# Patient Record
Sex: MALE | Race: WHITE | NOT HISPANIC OR LATINO | Employment: FULL TIME | URBAN - NONMETROPOLITAN AREA
[De-identification: names, ages, dates, MRNs, and addresses within clinical notes are randomized per-mention and may not be internally consistent; named-entity substitution may affect disease eponyms.]

---

## 2021-02-17 ENCOUNTER — HOSPITAL ENCOUNTER (EMERGENCY)
Facility: HOSPITAL | Age: 56
Discharge: HOME | End: 2021-02-17
Attending: EMERGENCY MEDICINE
Payer: COMMERCIAL

## 2021-02-17 VITALS
OXYGEN SATURATION: 98 % | RESPIRATION RATE: 18 BRPM | DIASTOLIC BLOOD PRESSURE: 90 MMHG | WEIGHT: 185 LBS | HEART RATE: 58 BPM | SYSTOLIC BLOOD PRESSURE: 145 MMHG | HEIGHT: 70 IN | BODY MASS INDEX: 26.48 KG/M2 | TEMPERATURE: 97 F

## 2021-02-17 DIAGNOSIS — M54.50 LOWER BACK PAIN: Primary | ICD-10-CM

## 2021-02-17 PROCEDURE — 99284 EMERGENCY DEPT VISIT MOD MDM: CPT | Performed by: PHYSICIAN ASSISTANT

## 2021-02-17 PROCEDURE — 99283 EMERGENCY DEPT VISIT LOW MDM: CPT

## 2021-02-17 RX ORDER — ATORVASTATIN CALCIUM 40 MG/1
TABLET, FILM COATED ORAL
COMMUNITY
Start: 2020-12-22

## 2021-02-17 RX ORDER — ASPIRIN 81 MG/1
81 TABLET ORAL DAILY
COMMUNITY

## 2021-02-17 RX ORDER — DIAZEPAM 5 MG/1
5 TABLET ORAL EVERY 12 HOURS PRN
Qty: 8 TABLET | Refills: 0 | Status: SHIPPED | OUTPATIENT
Start: 2021-02-17 | End: 2021-02-22

## 2021-02-17 RX ORDER — ACETAMINOPHEN 325 MG/1
650 TABLET ORAL ONCE
Status: COMPLETED | OUTPATIENT
Start: 2021-02-17 | End: 2021-02-17

## 2021-02-17 RX ADMIN — ACETAMINOPHEN 650 MG: 325 TABLET ORAL at 10:47

## 2021-02-17 NOTE — ED PROVIDER NOTES
History  Chief Complaint   Patient presents with    Back Pain     since last night increase of right lower back pain, pt has long history of back pain, involved with pain mgmt for same, was scheduled for injection tmw but was rescheduled due to weather, taking tylenol with no relief     54-year-old male presents emergency department for evaluation of right-sided lower back pain  Patient reports pain is chronic but was worsened last night after increased physical activity at work  Patient works in maintenance at [employer]  Reports last night he had increased physical activity and this morning had worsened back pain  Patient states he does follow with pain management for back pain  Reports he takes Tylenol at home for pain  States he gets injections in the back  Last injection was approximately 1 year ago  States he called recently for an appointment and was scheduled for tomorrow however this was rescheduled for Monday due to pending weather conditions  Patient reports he is scheduled to get 3 injections on the right side 1 injection in the left side  He denies any midline pain  He admits to some discomfort radiating down the posterior aspect of the right leg  He denies any loss of bowel or bladder control  He denies any saddle paresthesias  He denies any difficulty with bowel movements or urination  Patient denies any IV drug use  He denies any fevers or chills  Patient denies any fall or direct trauma to the back  Patient is able to ambulate  Drove himself to the emergency department        History provided by:  Patient  Back Pain  Location:  Lumbar spine  Quality:  Aching and shooting  Radiates to:  R posterior upper leg  Pain severity:  Mild  Onset quality:  Gradual  Timing:  Constant  Progression:  Worsening  Chronicity:  Recurrent  Context: lifting heavy objects and twisting    Relieved by:  Being still  Worsened by:  Palpation, ambulation, bending, movement and twisting  Associated symptoms: leg pain    Associated symptoms: no abdominal pain, no abdominal swelling, no bladder incontinence, no bowel incontinence, no chest pain, no dysuria, no fever, no headaches, no numbness, no paresthesias, no pelvic pain, no perianal numbness, no tingling, no weakness and no weight loss        Prior to Admission Medications   Prescriptions Last Dose Informant Patient Reported? Taking?   aspirin (ECOTRIN LOW STRENGTH) 81 mg EC tablet   Yes Yes   Sig: Take 81 mg by mouth daily   atorvastatin (Lipitor) 40 mg tablet   Yes Yes   Sig: Take by mouth      Facility-Administered Medications: None       Past Medical History:   Diagnosis Date    Back pain     High cholesterol     [facility]ocardial infarct [facility])        Past Surgical History:   Procedure Laterality Date    APPENDECTOMY      CORONARY ANGIOPLASTY WITH STENT PLACEMENT         History reviewed  No pertinent family history  I have reviewed and agree with the history as documented  E-Cigarette/Vaping    E-Cigarette Use Never User      E-Cigarette/Vaping Substances     Social History     Tobacco Use    Smoking status: Current Every Day Smoker     Types: Cigarettes    Smokeless tobacco: Never Used   Substance Use Topics    Alcohol use: Not Currently     Frequency: Monthly or less     Drinks per session: 1 or 2    Drug use: Never       Review of Systems   Constitutional: Negative for appetite change, chills, diaphoresis, fatigue, fever and weight loss  HENT: Negative  Eyes: Negative for visual disturbance  Respiratory: Negative for cough, choking, chest tightness, shortness of breath, wheezing and stridor  Cardiovascular: Negative  Negative for chest pain, palpitations and leg swelling  Gastrointestinal: Negative  Negative for abdominal pain and bowel incontinence  Genitourinary: Negative  Negative for bladder incontinence, dysuria and pelvic pain  Musculoskeletal: Positive for back pain   Negative for arthralgias, gait problem, joint swelling, myalgias, neck pain and neck stiffness  Skin: Negative  Negative for color change, pallor, rash and wound  Neurological: Negative  Negative for tingling, weakness, numbness, headaches and paresthesias  All other systems reviewed and are negative  Physical Exam  Physical Exam  Vitals signs and nursing note reviewed  Constitutional:       General: He is not in acute distress  Appearance: Normal appearance  He is normal weight  He is not ill-appearing, toxic-appearing or diaphoretic  HENT:      Head: Normocephalic and atraumatic  Nose: Nose normal  No congestion or rhinorrhea  Mouth/Throat:      Mouth: Mucous membranes are moist    Eyes:      Extraocular Movements: Extraocular movements intact  Conjunctiva/sclera: Conjunctivae normal       Pupils: Pupils are equal, round, and reactive to light  Neck:      Musculoskeletal: Normal range of motion and neck supple  Cardiovascular:      Rate and Rhythm: Normal rate and regular rhythm  Pulses:           Dorsalis pedis pulses are 2+ on the right side and 2+ on the left side  Posterior tibial pulses are 2+ on the right side and 2+ on the left side  Heart sounds: No murmur  Pulmonary:      Effort: Pulmonary effort is normal       Breath sounds: Normal breath sounds  No stridor  No wheezing, rhonchi or rales  Chest:      Chest wall: No tenderness  Abdominal:      General: Abdomen is flat  Bowel sounds are normal  There is no distension  Palpations: Abdomen is soft  Tenderness: There is no abdominal tenderness  There is no right CVA tenderness, left CVA tenderness or guarding  Musculoskeletal:         General: Tenderness present  No swelling or deformity  Right hip: Normal       Left hip: Normal       Right knee: Normal       Left knee: Normal       Cervical back: Normal       Thoracic back: Normal       Lumbar back: He exhibits decreased range of motion, tenderness, pain and spasm   He exhibits no bony tenderness, no swelling, no edema and no deformity  Back:       Right upper leg: Normal       Left upper leg: Normal    Skin:     General: Skin is warm and dry  Capillary Refill: Capillary refill takes less than 2 seconds  Coloration: Skin is not pale  Findings: No bruising, erythema, lesion or rash  Neurological:      General: No focal deficit present  Mental Status: He is alert and oriented to person, place, and time  Sensory: No sensory deficit  Motor: No weakness  Coordination: Coordination normal       Gait: Gait normal       Deep Tendon Reflexes: Reflexes normal    Psychiatric:         Mood and Affect: Mood normal          Behavior: Behavior normal          Thought Content: Thought content normal          Judgment: Judgment normal          Vital Signs  ED Triage Vitals [02/17/21 1019]   Temperature Pulse Respirations Blood Pressure SpO2   (!) 97 °F (36 1 °C) 61 20 (!) 175/85 100 %      Temp Source Heart Rate Source Patient Position - Orthostatic VS BP Location FiO2 (%)   Temporal Monitor Sitting Right arm --      Pain Score       5           Vitals:    02/17/21 1019 02/17/21 1030 02/17/21 1045   BP: (!) 175/85 145/80 145/90   Pulse: 61 58 58   Patient Position - Orthostatic VS: Sitting           Visual Acuity      ED Medications  Medications   acetaminophen (TYLENOL) tablet 650 mg (650 mg Oral Given 2/17/21 1047)       Diagnostic Studies  Results Reviewed     None                 No orders to display              Procedures  Procedures         ED Course  ED Course as of Feb 17 1243   Wed Feb 17, 2021   1056 I discussed physical exam findings with the patient  We discussed symptomatic treatment at home and symptoms that require prompt return to the ED  Will prescribe Valium to take at home  Patient was provided with Tylenol in the emergency department  Patient drove himself  Patient will follow-up with pain management on Monday    Patient agreed to this treatment plan he is educated on symptoms that require prompt return to the ED for further evaluation verbalized understanding  Patient was discharged home                  MDM  Number of Diagnoses or Management Options  Lower back pain: new and requires workup  Diagnosis management comments: 42-year-old male presents emergency department for evaluation of acute on chronic lower back pain  Vitals and medical record reviewed  No injury or direct trauma  Increased physical activity at work  On exam patient has tenderness over right aspect in the lumbar area  Normal deep tendon reflexes  No rashes or bruising  No midline tenderness  Patient denies any saddle paresthesias, fevers, chills  Patient has follow-up with pain management on Monday or injection  We discussed results and findings  Patient continue to use Tylenol at home  Will prescribe Valium and have patient rest  Patient was educated on symptoms that require prompt return to the emergency department for further evaluation and verbalized understanding  Patient agreed with treatment plan and was discharged home  Amount and/or Complexity of Data Reviewed  Review and summarize past medical records: yes        Disposition  Final diagnoses:   Lower back pain     Time reflects when diagnosis was documented in both MDM as applicable and the Disposition within this note     Time User Action Codes Description Comment    2/17/2021 10:41 AM Marisela Rivera Add [M54 5] Lower back pain       ED Disposition     ED Disposition Condition Date/Time Comment    Discharge Stable Wed Feb 17, 2021 10:41 AM Oswald Quevedo discharge to home/self care              Follow-up Information     Follow up With Specialties Details Why Contact Ivana Ray, DO Family Medicine In 1 week As needed, If symptoms worsen [address]  [phone]      Your pain management specialist  In 1 week For continued care           Discharge Medication List as of 2/17/2021 10:46 AM      START taking these medications    Details   diazepam (VALIUM) 5 mg tablet Take 1 tablet (5 mg total) by mouth every 12 (twelve) hours as needed for anxiety for up to 5 days, Starting Wed 2/17/2021, Until Mon 2/22/2021, Normal         CONTINUE these medications which have NOT CHANGED    Details   aspirin (ECOTRIN LOW STRENGTH) 81 mg EC tablet Take 81 mg by mouth daily, Historical Med      atorvastatin (Lipitor) 40 mg tablet Take by mouth, Starting Tue 12/22/2020, Historical Med           No discharge procedures on file      PDMP Review     None          ED Provider  Electronically Signed by           Liset Chen PA-C  02/17/21 9218

## 2021-02-17 NOTE — Clinical Note
Abdoul Barrera was seen and treated in our emergency department on 2/17/2021  Diagnosis:     Xin Boudreaux  may return to work on return date  He may return on this date: 02/19/2021         If you have any questions or concerns, please don't hesitate to call        Bj Avelar PA-C    ______________________________           _______________          _______________  Hospital Representative                              Date                                Time

## 2021-02-17 NOTE — DISCHARGE INSTRUCTIONS
If you have any new or worsening symptoms please return to the ER  Please continue to use Tylenol every 6 hours as needed for back pain  Please use Valium, muscle relaxer as prescribed  Do not operate any heavy machinery while taking this muscle relaxer as a can make you sleepy  Please follow-up with your pain management specialist   Please continue to rest avoid any heavy lifting or excessive twisting and turning

## 2021-04-26 ENCOUNTER — IMMUNIZATIONS (OUTPATIENT)
Dept: FAMILY MEDICINE CLINIC | Facility: HOSPITAL | Age: 56
End: 2021-04-26

## 2021-04-26 DIAGNOSIS — Z23 ENCOUNTER FOR IMMUNIZATION: Primary | ICD-10-CM

## 2021-04-26 PROCEDURE — 91300 SARS-COV-2 / COVID-19 MRNA VACCINE (PFIZER-BIONTECH) 30 MCG: CPT

## 2021-04-26 PROCEDURE — 0001A SARS-COV-2 / COVID-19 MRNA VACCINE (PFIZER-BIONTECH) 30 MCG: CPT

## 2021-05-20 ENCOUNTER — IMMUNIZATIONS (OUTPATIENT)
Dept: FAMILY MEDICINE CLINIC | Facility: HOSPITAL | Age: 56
End: 2021-05-20

## 2021-05-20 DIAGNOSIS — Z23 ENCOUNTER FOR IMMUNIZATION: Primary | ICD-10-CM

## 2021-05-20 PROCEDURE — 0002A SARS-COV-2 / COVID-19 MRNA VACCINE (PFIZER-BIONTECH) 30 MCG: CPT

## 2021-05-20 PROCEDURE — 91300 SARS-COV-2 / COVID-19 MRNA VACCINE (PFIZER-BIONTECH) 30 MCG: CPT

## 2023-01-23 DIAGNOSIS — N61.0 MASTITIS WITHOUT ABSCESS: ICD-10-CM

## 2023-01-23 DIAGNOSIS — N63.42 UNSPECIFIED LUMP IN LEFT BREAST, SUBAREOLAR: ICD-10-CM

## 2023-06-04 ENCOUNTER — HOSPITAL ENCOUNTER (EMERGENCY)
Facility: HOSPITAL | Age: 58
Discharge: HOME | End: 2023-06-04
Attending: EMERGENCY MEDICINE
Payer: OTHER GOVERNMENT

## 2023-06-04 ENCOUNTER — APPOINTMENT (EMERGENCY)
Dept: RADIOLOGY | Facility: HOSPITAL | Age: 58
End: 2023-06-04
Payer: OTHER GOVERNMENT

## 2023-06-04 VITALS
HEART RATE: 52 BPM | DIASTOLIC BLOOD PRESSURE: 76 MMHG | TEMPERATURE: 97.8 F | OXYGEN SATURATION: 96 % | SYSTOLIC BLOOD PRESSURE: 118 MMHG | RESPIRATION RATE: 14 BRPM

## 2023-06-04 DIAGNOSIS — R07.9 CHEST PAIN: Primary | ICD-10-CM

## 2023-06-04 LAB
2HR DELTA HS TROPONIN: 1 NG/L
ALBUMIN SERPL BCP-MCNC: 4.1 G/DL (ref 3.5–5)
ALP SERPL-CCNC: 80 U/L (ref 34–104)
ALT SERPL W P-5'-P-CCNC: 13 U/L (ref 7–52)
ANION GAP SERPL CALCULATED.3IONS-SCNC: 8 MMOL/L (ref 4–13)
APTT PPP: 30 SECONDS (ref 23–37)
AST SERPL W P-5'-P-CCNC: 18 U/L (ref 13–39)
BASOPHILS # BLD AUTO: 0.08 THOUSANDS/ÂΜL (ref 0–0.1)
BASOPHILS NFR BLD AUTO: 1 % (ref 0–1)
BILIRUB SERPL-MCNC: 0.71 MG/DL (ref 0.2–1)
BNP SERPL-MCNC: 25 PG/ML (ref 0–100)
BUN SERPL-MCNC: 16 MG/DL (ref 5–25)
CALCIUM SERPL-MCNC: 9 MG/DL (ref 8.4–10.2)
CARDIAC TROPONIN I PNL SERPL HS: 10 NG/L
CARDIAC TROPONIN I PNL SERPL HS: 11 NG/L
CHLORIDE SERPL-SCNC: 103 MMOL/L (ref 96–108)
CO2 SERPL-SCNC: 23 MMOL/L (ref 21–32)
CREAT SERPL-MCNC: 0.81 MG/DL (ref 0.6–1.3)
EOSINOPHIL # BLD AUTO: 0.23 THOUSAND/ÂΜL (ref 0–0.61)
EOSINOPHIL NFR BLD AUTO: 2 % (ref 0–6)
ERYTHROCYTE [DISTWIDTH] IN BLOOD BY AUTOMATED COUNT: 12.3 % (ref 11.6–15.1)
GFR SERPL CREATININE-BSD FRML MDRD: 98 ML/MIN/1.73SQ M
GLUCOSE SERPL-MCNC: 84 MG/DL (ref 65–140)
HCT VFR BLD AUTO: 43.1 % (ref 36.5–49.3)
HGB BLD-MCNC: 14.6 G/DL (ref 12–17)
IMM GRANULOCYTES # BLD AUTO: 0.03 THOUSAND/UL (ref 0–0.2)
IMM GRANULOCYTES NFR BLD AUTO: 0 % (ref 0–2)
INR PPP: 1.05 (ref 0.84–1.19)
LYMPHOCYTES # BLD AUTO: 3.31 THOUSANDS/ÂΜL (ref 0.6–4.47)
LYMPHOCYTES NFR BLD AUTO: 27 % (ref 14–44)
MCH RBC QN AUTO: 32.1 PG (ref 26.8–34.3)
MCHC RBC AUTO-ENTMCNC: 33.9 G/DL (ref 31.4–37.4)
MCV RBC AUTO: 95 FL (ref 82–98)
MONOCYTES # BLD AUTO: 0.84 THOUSAND/ÂΜL (ref 0.17–1.22)
MONOCYTES NFR BLD AUTO: 7 % (ref 4–12)
NEUTROPHILS # BLD AUTO: 7.8 THOUSANDS/ÂΜL (ref 1.85–7.62)
NEUTS SEG NFR BLD AUTO: 63 % (ref 43–75)
NRBC BLD AUTO-RTO: 0 /100 WBCS
PLATELET # BLD AUTO: 235 THOUSANDS/UL (ref 149–390)
PMV BLD AUTO: 10.1 FL (ref 8.9–12.7)
POTASSIUM SERPL-SCNC: 3.8 MMOL/L (ref 3.5–5.3)
PROT SERPL-MCNC: 6.9 G/DL (ref 6.4–8.4)
PROTHROMBIN TIME: 13.8 SECONDS (ref 11.6–14.5)
RBC # BLD AUTO: 4.55 MILLION/UL (ref 3.88–5.62)
SODIUM SERPL-SCNC: 134 MMOL/L (ref 135–147)
WBC # BLD AUTO: 12.29 THOUSAND/UL (ref 4.31–10.16)

## 2023-06-04 PROCEDURE — 85025 COMPLETE CBC W/AUTO DIFF WBC: CPT | Performed by: EMERGENCY MEDICINE

## 2023-06-04 PROCEDURE — 85730 THROMBOPLASTIN TIME PARTIAL: CPT | Performed by: EMERGENCY MEDICINE

## 2023-06-04 PROCEDURE — 85610 PROTHROMBIN TIME: CPT | Performed by: EMERGENCY MEDICINE

## 2023-06-04 PROCEDURE — 71045 X-RAY EXAM CHEST 1 VIEW: CPT

## 2023-06-04 PROCEDURE — 99285 EMERGENCY DEPT VISIT HI MDM: CPT

## 2023-06-04 PROCEDURE — 36415 COLL VENOUS BLD VENIPUNCTURE: CPT | Performed by: EMERGENCY MEDICINE

## 2023-06-04 PROCEDURE — 93005 ELECTROCARDIOGRAM TRACING: CPT

## 2023-06-04 PROCEDURE — 83880 ASSAY OF NATRIURETIC PEPTIDE: CPT | Performed by: EMERGENCY MEDICINE

## 2023-06-04 PROCEDURE — 84484 ASSAY OF TROPONIN QUANT: CPT | Performed by: EMERGENCY MEDICINE

## 2023-06-04 PROCEDURE — 80053 COMPREHEN METABOLIC PANEL: CPT | Performed by: EMERGENCY MEDICINE

## 2023-06-04 NOTE — Clinical Note
Richie Person was seen and treated in our emergency department on 6/4/2023  Diagnosis:     Susan Gonsalves  may return to work on return date  He may return on this date: 06/12/2023         If you have any questions or concerns, please don't hesitate to call        Vasquez Mckay DO    ______________________________           _______________          _______________  Hospital Representative                              Date                                Time

## 2023-06-04 NOTE — DISCHARGE INSTRUCTIONS
Rest, no exertional activities  Continue usual medications daily including aspirin  Call cardiologist tomorrow to arrange evaluation and further testing within next few days  Return immediately if worse or any new symptoms or reconsider hospitalization

## 2023-06-04 NOTE — ED PROVIDER NOTES
"History  Chief Complaint   Patient presents with   • Chest Pain     Pt states at 1230 started with chest pain \"like you swallowed water too hard\" and now radiating up into throat  +SOB  Denies dizziness, nausea, diaphoresis     20-year-old male presents from work describes subacute onset mild dyspnea with retrosternal chest tightness that became moderate and amenable to nitroglycerin  Not related to heavy activity using a pallet jack  This is only used nitroglycerin twice in 11 years since heart attack, 2 stents at [facility]   Has not seen cardiologist in few years  Denies viral prodrome  Smoking without difficulty  He is compliant with his hyperlipidemia medication and aspirin, usually  History provided by:  Patient  Chest Pain  Pain location:  Substernal area  Pain quality: dull and tightness    Pain radiates to:  Does not radiate  Pain radiates to the back: no    Pain severity:  Moderate  Onset quality:  Sudden  Timing:  Constant  Progression:  Improving  Chronicity:  New  Context: no trauma    Relieved by:  Nitroglycerin  Associated symptoms: shortness of breath    Associated symptoms: no abdominal pain    Risk factors: coronary artery disease and smoking        Prior to Admission Medications   Prescriptions Last Dose Informant Patient Reported? Taking?   aspirin (ECOTRIN LOW STRENGTH) 81 mg EC tablet   Yes No   Sig: Take 81 mg by mouth daily   atorvastatin (Lipitor) 40 mg tablet   Yes No   Sig: Take by mouth   diazepam (VALIUM) 5 mg tablet   No No   Sig: Take 1 tablet (5 mg total) by mouth every 12 (twelve) hours as needed for anxiety for up to 5 days      Facility-Administered Medications: None       Past Medical History:   Diagnosis Date   • Back pain    • High cholesterol    • Myocardial infarct [facility])        Past Surgical History:   Procedure Laterality Date   • APPENDECTOMY     • CORONARY ANGIOPLASTY WITH STENT PLACEMENT         History reviewed  No pertinent family history    I have " reviewed and agree with the history as documented  E-Cigarette/Vaping   • E-Cigarette Use Never User      E-Cigarette/Vaping Substances     Social History     Tobacco Use   • Smoking status: Every Day     Types: Cigarettes   • Smokeless tobacco: Never   Vaping Use   • Vaping Use: Never used   Substance Use Topics   • Alcohol use: Not Currently   • Drug use: Never       Review of Systems   Respiratory: Positive for shortness of breath  Cardiovascular: Positive for chest pain  Gastrointestinal: Negative for abdominal pain  All other systems reviewed and are negative  Physical Exam  Physical Exam  Vitals and nursing note reviewed  Constitutional:       Comments: Pleasant, comfortable-appearing   HENT:      Head: Normocephalic and atraumatic  Mouth/Throat:      Mouth: Mucous membranes are moist       Pharynx: Oropharynx is clear  Eyes:      Conjunctiva/sclera: Conjunctivae normal       Pupils: Pupils are equal, round, and reactive to light  Cardiovascular:      Rate and Rhythm: Normal rate and regular rhythm  Pulses:           Radial pulses are 2+ on the right side and 2+ on the left side  Heart sounds: Normal heart sounds  Pulmonary:      Effort: Pulmonary effort is normal       Breath sounds: Normal breath sounds  Chest:      Chest wall: No tenderness  Abdominal:      General: Bowel sounds are normal  There is no distension  Palpations: Abdomen is soft  Tenderness: There is no abdominal tenderness  Musculoskeletal:         General: No deformity  Cervical back: Neck supple  Right lower leg: No edema  Left lower leg: No edema  Skin:     General: Skin is warm and dry  Neurological:      General: No focal deficit present  Mental Status: He is alert and oriented to person, place, and time  Cranial Nerves: No cranial nerve deficit        Coordination: Coordination normal    Psychiatric:         Behavior: Behavior normal          Thought Content:  Thought content normal          Judgment: Judgment normal          Vital Signs  ED Triage Vitals [06/04/23 1350]   Temp Pulse Respirations Blood Pressure SpO2   -- 63 20 128/82 98 %      Temp src Heart Rate Source Patient Position - Orthostatic VS BP Location FiO2 (%)   -- -- -- -- --      Pain Score       --           Vitals:    06/04/23 1350 06/04/23 1400   BP: 128/82 128/82   Pulse: 63 57         Visual Acuity      ED Medications  Medications - No data to display    Diagnostic Studies  Results Reviewed     Procedure Component Value Units Date/Time    HS Troponin I 2hr [[id number]]  (Normal) Collected: 06/04/23 1603    Lab Status: Final result Specimen: Blood from Arm, Left Updated: 06/04/23 1630     hs TnI 2hr 11 ng/L      Delta 2hr hsTnI 1 ng/L     HS Troponin I 4hr [[id number]]     Lab Status: No result Specimen: Blood     Protime-INR [[id number]]  (Normal) Collected: 06/04/23 1359    Lab Status: Final result Specimen: Blood from Arm, Left Updated: 06/04/23 1458     Protime 13 8 seconds      INR 1 05    APTT [[id number]]  (Normal) Collected: 06/04/23 1359    Lab Status: Final result Specimen: Blood from Arm, Left Updated: 06/04/23 1458     PTT 30 seconds     HS Troponin 0hr (reflex protocol) [[id number]]  (Normal) Collected: 06/04/23 1359    Lab Status: Final result Specimen: Blood from Arm, Left Updated: 06/04/23 1434     hs TnI 0hr 10 ng/L     B-Type Natriuretic Peptide(BNP) [[id number]]  (Normal) Collected: 06/04/23 1359    Lab Status: Final result Specimen: Blood from Arm, Left Updated: 06/04/23 1433     BNP 25 pg/mL     Comprehensive metabolic panel [[id number]]  (Abnormal) Collected: 06/04/23 1359    Lab Status: Final result Specimen: Blood from Arm, Left Updated: 06/04/23 1431     Sodium 134 mmol/L      Potassium 3 8 mmol/L      Chloride 103 mmol/L      CO2 23 mmol/L      ANION GAP 8 mmol/L      BUN 16 mg/dL      Creatinine 0 81 mg/dL      Glucose 84 mg/dL      Calcium 9 0 mg/dL      AST 18 U/L ALT 13 U/L      Alkaline Phosphatase 80 U/L      Total Protein 6 9 g/dL      Albumin 4 1 g/dL      Total Bilirubin 0 71 mg/dL      eGFR 98 ml/min/1 73sq m     Narrative:      National Kidney Disease Foundation guidelines for Chronic Kidney Disease (CKD):   •  Stage 1 with normal or high GFR (GFR > 90 mL/min/1 73 square meters)  •  Stage 2 Mild CKD (GFR = 60-89 mL/min/1 73 square meters)  •  Stage 3A Moderate CKD (GFR = 45-59 mL/min/1 73 square meters)  •  Stage 3B Moderate CKD (GFR = 30-44 mL/min/1 73 square meters)  •  Stage 4 Severe CKD (GFR = 15-29 mL/min/1 73 square meters)  •  Stage 5 End Stage CKD (GFR <15 mL/min/1 73 square meters)  Note: GFR calculation is accurate only with a steady state creatinine    CBC and differential [[id number]]  (Abnormal) Collected: 06/04/23 1359    Lab Status: Final result Specimen: Blood from Arm, Left Updated: 06/04/23 1405     WBC 12 29 Thousand/uL      RBC 4 55 Million/uL      Hemoglobin 14 6 g/dL      Hematocrit 43 1 %      MCV 95 fL      MCH 32 1 pg      MCHC 33 9 g/dL      RDW 12 3 %      MPV 10 1 fL      Platelets 071 Thousands/uL      nRBC 0 /100 WBCs      Neutrophils Relative 63 %      Immat GRANS % 0 %      Lymphocytes Relative 27 %      Monocytes Relative 7 %      Eosinophils Relative 2 %      Basophils Relative 1 %      Neutrophils Absolute 7 80 Thousands/µL      Immature Grans Absolute 0 03 Thousand/uL      Lymphocytes Absolute 3 31 Thousands/µL      Monocytes Absolute 0 84 Thousand/µL      Eosinophils Absolute 0 23 Thousand/µL      Basophils Absolute 0 08 Thousands/µL                  XR chest portable   ED Interpretation by Berlin Lowery DO (06/04 3235)   No infiltrate or cardiomegaly or pneumothorax                 Procedures  ECG 12 Lead Documentation Only    Date/Time: 6/4/2023 1:58 PM    Performed by: Berlin Lowery DO  Authorized by: Berlin Lowery DO    Indications / Diagnosis:  Chest pain  ECG reviewed by me, the ED Provider: yes    Patient location: ED  Interpretation:     Interpretation: normal    Rate:     ECG rate:  60    ECG rate assessment: normal    Rhythm:     Rhythm: sinus rhythm    Ectopy:     Ectopy: none    QRS:     QRS axis:  Normal  Conduction:     Conduction: normal    ST segments:     ST segments:  Normal  T waves:     T waves: inverted      Inverted:  V1             ED Course  ED Course as of 06/04/23 1646   Sun Jun 04, 2023   1506 hs TnI 0hr: 10   1506 BNP: 25   1506 Sodium(!): 134   1507 WBC(!): 12 29   1601 Notes he is comfortable, no chest symptoms   1631 Delta 2hr hsTnI: 1   1631 hs TnI 2hr: 11   1641 I discussed results with patient and disposition  He prefer discharge home and outpatient follow-up  Cardiology Dr Lakshmi Jiang contacted and aware  Patient currently remains stable, pain-free  Notes his typical heart rate is lower  He chooses outpatient follow-up, is off the next few days and will contact cardiology as soon as possible to arrange  HEART Risk Score    Flowsheet Row Most Recent Value   Heart Score Risk Calculator    History 1 Filed at: 06/04/2023 1631   ECG 0 Filed at: 06/04/2023 1631   Age 1 Filed at: 06/04/2023 1631   Risk Factors 2 Filed at: 06/04/2023 1631   Troponin 0 Filed at: 06/04/2023 1631   HEART Score 4 Filed at: 06/04/2023 1631                        SBIRT 22yo+    Flowsheet Row Most Recent Value   Initial Alcohol Screen: US AUDIT-C     1  How often do you have a drink containing alcohol? 0 Filed at: 06/04/2023 1351   2  How many drinks containing alcohol do you have on a typical day you are drinking? 0 Filed at: 06/04/2023 1351   3a  Male UNDER 65: How often do you have five or more drinks on one occasion? 0 Filed at: 06/04/2023 1351   3b  FEMALE Any Age, or MALE 65+: How often do you have 4 or more drinks on one occassion? 0 Filed at: 06/04/2023 1351   Audit-C Score 0 Filed at: 06/04/2023 1351   MARYURI: How many times in the past year have you        Used an illegal drug or used a prescription medication for non-medical reasons? Never Filed at: 06/04/2023 1351                    Medical Decision Making  Chest pain: acute illness or injury  Amount and/or Complexity of Data Reviewed  External Data Reviewed: notes  Labs: ordered  Decision-making details documented in ED Course  Radiology: ordered and independent interpretation performed  Decision-making details documented in ED Course  ECG/medicine tests: ordered and independent interpretation performed  Decision-making details documented in ED Course  Disposition  Final diagnoses:   Chest pain     Time reflects when diagnosis was documented in both MDM as applicable and the Disposition within this note     Time User Action Codes Description Comment    6/4/2023  4:42 PM Martin Roy Add [R07 9] Chest pain       ED Disposition     ED Disposition   Discharge    Condition   Stable    Date/Time   Sun Jun 4, 2023  4:42 PM    Comment   Juan Alberto Velasquez [facility] discharge to home/self care  Follow-up Information     Follow up With Specialties Details Why Contact Info    Davina Huynh DO Cardiology   [address]  [phone]            Patient's Medications   Discharge Prescriptions    No medications on file       No discharge procedures on file      PDMP Review     None          ED Provider  Electronically Signed by           Jayant Heard DO  06/04/23 8073

## 2023-06-11 LAB
ATRIAL RATE: 60 BPM
P AXIS: 51 DEGREES
PR INTERVAL: 132 MS
QRS AXIS: 39 DEGREES
QRSD INTERVAL: 88 MS
QT INTERVAL: 414 MS
QTC INTERVAL: 414 MS
T WAVE AXIS: 66 DEGREES
VENTRICULAR RATE: 60 BPM

## 2023-06-11 PROCEDURE — 93010 ELECTROCARDIOGRAM REPORT: CPT | Performed by: INTERNAL MEDICINE

## 2024-02-16 ENCOUNTER — APPOINTMENT (EMERGENCY)
Dept: RADIOLOGY | Facility: HOSPITAL | Age: 59
End: 2024-02-16
Payer: OTHER GOVERNMENT

## 2024-02-16 ENCOUNTER — HOSPITAL ENCOUNTER (EMERGENCY)
Facility: HOSPITAL | Age: 59
Discharge: HOME | End: 2024-02-16
Attending: EMERGENCY MEDICINE
Payer: OTHER GOVERNMENT

## 2024-02-16 VITALS
BODY MASS INDEX: 25.41 KG/M2 | SYSTOLIC BLOOD PRESSURE: 153 MMHG | WEIGHT: 177.47 LBS | OXYGEN SATURATION: 97 % | HEART RATE: 50 BPM | TEMPERATURE: 97.2 F | HEIGHT: 70 IN | DIASTOLIC BLOOD PRESSURE: 111 MMHG | RESPIRATION RATE: 18 BRPM

## 2024-02-16 DIAGNOSIS — R07.9 CHEST PAIN: Primary | ICD-10-CM

## 2024-02-16 DIAGNOSIS — E83.42 HYPOMAGNESEMIA: ICD-10-CM

## 2024-02-16 DIAGNOSIS — R00.2 PALPITATIONS: ICD-10-CM

## 2024-02-16 LAB
2HR DELTA HS TROPONIN: -1 NG/L
ALBUMIN SERPL BCP-MCNC: 4.2 G/DL (ref 3.5–5)
ALP SERPL-CCNC: 82 U/L (ref 34–104)
ALT SERPL W P-5'-P-CCNC: 17 U/L (ref 7–52)
ANION GAP SERPL CALCULATED.3IONS-SCNC: 3 MMOL/L
AST SERPL W P-5'-P-CCNC: 17 U/L (ref 13–39)
ATRIAL RATE: 56 BPM
BASOPHILS # BLD AUTO: 0.08 THOUSANDS/ÂΜL (ref 0–0.1)
BASOPHILS NFR BLD AUTO: 1 % (ref 0–1)
BILIRUB SERPL-MCNC: 0.71 MG/DL (ref 0.2–1)
BILIRUB UR QL STRIP: NEGATIVE
BUN SERPL-MCNC: 14 MG/DL (ref 5–25)
CALCIUM SERPL-MCNC: 9.2 MG/DL (ref 8.4–10.2)
CARDIAC TROPONIN I PNL SERPL HS: 23 NG/L
CARDIAC TROPONIN I PNL SERPL HS: 24 NG/L
CHLORIDE SERPL-SCNC: 107 MMOL/L (ref 96–108)
CLARITY UR: CLEAR
CO2 SERPL-SCNC: 25 MMOL/L (ref 21–32)
COLOR UR: YELLOW
CREAT SERPL-MCNC: 0.87 MG/DL (ref 0.6–1.3)
EOSINOPHIL # BLD AUTO: 0.13 THOUSAND/ÂΜL (ref 0–0.61)
EOSINOPHIL NFR BLD AUTO: 1 % (ref 0–6)
ERYTHROCYTE [DISTWIDTH] IN BLOOD BY AUTOMATED COUNT: 12.4 % (ref 11.6–15.1)
GFR SERPL CREATININE-BSD FRML MDRD: 95 ML/MIN/1.73SQ M
GLUCOSE SERPL-MCNC: 89 MG/DL (ref 65–140)
GLUCOSE UR STRIP-MCNC: NEGATIVE MG/DL
HCT VFR BLD AUTO: 44.2 % (ref 36.5–49.3)
HGB BLD-MCNC: 15 G/DL (ref 12–17)
HGB UR QL STRIP.AUTO: NEGATIVE
IMM GRANULOCYTES # BLD AUTO: 0.03 THOUSAND/UL (ref 0–0.2)
IMM GRANULOCYTES NFR BLD AUTO: 0 % (ref 0–2)
KETONES UR STRIP-MCNC: NEGATIVE MG/DL
LACTATE SERPL-SCNC: 0.9 MMOL/L (ref 0.5–2)
LEUKOCYTE ESTERASE UR QL STRIP: NEGATIVE
LYMPHOCYTES # BLD AUTO: 2.48 THOUSANDS/ÂΜL (ref 0.6–4.47)
LYMPHOCYTES NFR BLD AUTO: 23 % (ref 14–44)
MAGNESIUM SERPL-MCNC: 1.8 MG/DL (ref 1.9–2.7)
MCH RBC QN AUTO: 31.4 PG (ref 26.8–34.3)
MCHC RBC AUTO-ENTMCNC: 33.9 G/DL (ref 31.4–37.4)
MCV RBC AUTO: 93 FL (ref 82–98)
MONOCYTES # BLD AUTO: 0.8 THOUSAND/ÂΜL (ref 0.17–1.22)
MONOCYTES NFR BLD AUTO: 7 % (ref 4–12)
NEUTROPHILS # BLD AUTO: 7.37 THOUSANDS/ÂΜL (ref 1.85–7.62)
NEUTS SEG NFR BLD AUTO: 68 % (ref 43–75)
NITRITE UR QL STRIP: NEGATIVE
NRBC BLD AUTO-RTO: 0 /100 WBCS
P AXIS: 58 DEGREES
PH UR STRIP.AUTO: 7.5 [PH]
PLATELET # BLD AUTO: 260 THOUSANDS/UL (ref 149–390)
PMV BLD AUTO: 10.3 FL (ref 8.9–12.7)
POTASSIUM SERPL-SCNC: 3.9 MMOL/L (ref 3.5–5.3)
PR INTERVAL: 130 MS
PROT SERPL-MCNC: 7 G/DL (ref 6.4–8.4)
PROT UR STRIP-MCNC: NEGATIVE MG/DL
QRS AXIS: 56 DEGREES
QRSD INTERVAL: 94 MS
QT INTERVAL: 416 MS
QTC INTERVAL: 401 MS
RBC # BLD AUTO: 4.77 MILLION/UL (ref 3.88–5.62)
SODIUM SERPL-SCNC: 135 MMOL/L (ref 135–147)
SP GR UR STRIP.AUTO: 1.01 (ref 1–1.03)
T WAVE AXIS: 62 DEGREES
TSH SERPL DL<=0.05 MIU/L-ACNC: 2.76 UIU/ML (ref 0.45–4.5)
UROBILINOGEN UR QL STRIP.AUTO: 0.2 E.U./DL
VENTRICULAR RATE: 56 BPM
WBC # BLD AUTO: 10.89 THOUSAND/UL (ref 4.31–10.16)

## 2024-02-16 PROCEDURE — 85025 COMPLETE CBC W/AUTO DIFF WBC: CPT | Performed by: PHYSICIAN ASSISTANT

## 2024-02-16 PROCEDURE — 93010 ELECTROCARDIOGRAM REPORT: CPT | Performed by: STUDENT IN AN ORGANIZED HEALTH CARE EDUCATION/TRAINING PROGRAM

## 2024-02-16 PROCEDURE — 36415 COLL VENOUS BLD VENIPUNCTURE: CPT | Performed by: PHYSICIAN ASSISTANT

## 2024-02-16 PROCEDURE — 80053 COMPREHEN METABOLIC PANEL: CPT | Performed by: PHYSICIAN ASSISTANT

## 2024-02-16 PROCEDURE — 84443 ASSAY THYROID STIM HORMONE: CPT | Performed by: PHYSICIAN ASSISTANT

## 2024-02-16 PROCEDURE — 84484 ASSAY OF TROPONIN QUANT: CPT | Performed by: PHYSICIAN ASSISTANT

## 2024-02-16 PROCEDURE — 93005 ELECTROCARDIOGRAM TRACING: CPT

## 2024-02-16 PROCEDURE — 99285 EMERGENCY DEPT VISIT HI MDM: CPT | Performed by: EMERGENCY MEDICINE

## 2024-02-16 PROCEDURE — 81003 URINALYSIS AUTO W/O SCOPE: CPT | Performed by: PHYSICIAN ASSISTANT

## 2024-02-16 PROCEDURE — 71045 X-RAY EXAM CHEST 1 VIEW: CPT

## 2024-02-16 PROCEDURE — 83605 ASSAY OF LACTIC ACID: CPT | Performed by: PHYSICIAN ASSISTANT

## 2024-02-16 PROCEDURE — 83735 ASSAY OF MAGNESIUM: CPT | Performed by: PHYSICIAN ASSISTANT

## 2024-02-16 RX ORDER — UREA 10 %
500 LOTION (ML) TOPICAL 2 TIMES DAILY
Qty: 14 TABLET | Refills: 0 | Status: SHIPPED | OUTPATIENT
Start: 2024-02-16 | End: 2024-02-23

## 2024-02-16 RX ORDER — ASPIRIN 81 MG/1
324 TABLET, CHEWABLE ORAL ONCE
Status: COMPLETED | OUTPATIENT
Start: 2024-02-16 | End: 2024-02-16

## 2024-02-16 RX ORDER — MAGNESIUM SULFATE 1 G/100ML
1 INJECTION INTRAVENOUS ONCE
Status: COMPLETED | OUTPATIENT
Start: 2024-02-16 | End: 2024-02-16

## 2024-02-16 RX ORDER — NITROGLYCERIN 0.4 MG/1
0.4 TABLET SUBLINGUAL ONCE
Status: DISCONTINUED | OUTPATIENT
Start: 2024-02-16 | End: 2024-02-16 | Stop reason: HOSPADM

## 2024-02-16 RX ADMIN — ASPIRIN 81 MG CHEWABLE TABLET 324 MG: 81 TABLET CHEWABLE at 10:52

## 2024-02-16 RX ADMIN — MAGNESIUM SULFATE HEPTAHYDRATE 1 G: 1 INJECTION, SOLUTION INTRAVENOUS at 11:53

## 2024-02-16 NOTE — Clinical Note
Zac Cha was seen and treated in our emergency department on 2/16/2024.                Diagnosis:     Zac  may return to work on return date, is off the rest of the shift today.    He may return on this date: 02/19/2024         If you have any questions or concerns, please don't hesitate to call.      Colin Barba PA-C    ______________________________           _______________          _______________  Hospital Representative                              Date                                Time

## 2024-02-16 NOTE — ED ATTENDING ATTESTATION
2/16/2024  I, Guille Booker MD, saw and evaluated the patient. I have discussed the patient with the resident/non-physician practitioner and agree with the resident's/non-physician practitioner's findings, Plan of Care, and MDM as documented in the resident's/non-physician practitioner's note, except where noted. All available labs and Radiology studies were reviewed.  I was present for key portions of any procedure(s) performed by the resident/non-physician practitioner and I was immediately available to provide assistance.       At this point I agree with the current assessment done in the Emergency Department.  I have conducted an independent evaluation of this patient a history and physical is as follows:    ED Course     History of cardiac stents.  Takes an aspirin daily.  States he complained of palpitations yesterday.  Took nitroglycerin which helped relieve that.  Has been complaining of heaviness since.  Different from past angina symptoms.  Had a nuclear stress test in June 2023 which was unremarkable.    On exam the patient is awake alert.  Nontoxic-appearing.  Lungs are clear to auscultation.  Heart is a regular rate and rhythm  Abdomen soft nontender good bowel sounds.    Critical Care Time  Procedures

## 2024-02-16 NOTE — ED PROVIDER NOTES
"History  Chief Complaint   Patient presents with    Palpitations     Patient felt like his heart was \"beating very fast and like the rhythm was off\". Symptoms began last night at 1930. Take one nitro with relief. How experiencing chest heaviness.      Patient is a 58-year-old male PMH CAD, dyslipidemia who presents to the ED with the c/o chest pressure and palpitations.  The patient states that last evening around 7 PM at rest he felt his heart was racing.  States that he took a nitro .  This episode lasted 20 minutes seem to dissipate after the nitro.  Not have pain during this episode..  The patient states that he did not have any chest pain during this episode.  Patient states that this morning when he awoke around 7 AM he was having some chest pressure.  Did not take any aspirin this morning or any nitro.  Patient tried to contact his physician to make a sick appointment and was directed to the ER.  Patient states that the chest feels \"heavy\" 1 out of 10 sensation.  Denies any shortness of breath cough, fevers chills nausea vomiting dizziness back pain abdominal pain.  States that until Monday he was a daily smoker he has now started using nicotine patches.    H/o NSTEMI with stent placement 6/25/12      Palpitations  Palpitations quality:  Fast  Onset quality:  Sudden  Duration:  20 minutes  Progression:  Resolved  Chronicity:  New  Associated symptoms: chest pain and chest pressure    Associated symptoms: no back pain, no cough, no diaphoresis, no dizziness, no leg pain, no lower extremity edema, no near-syncope, no orthopnea, no PND and no shortness of breath    Chest pain:     Quality: pressure      Severity:  Mild    Duration:  4 hours    Timing:  Constant    Progression:  Unchanged    Chronicity:  New  Risk factors: heart disease        Prior to Admission Medications   Prescriptions Last Dose Informant Patient Reported? Taking?   aspirin (ECOTRIN LOW STRENGTH) 81 mg EC tablet 2/15/2024  Yes Yes   Sig: Take " 81 mg by mouth daily   atorvastatin (Lipitor) 40 mg tablet 2/15/2024  Yes Yes   Sig: Take by mouth      Facility-Administered Medications: None       Past Medical History:   Diagnosis Date    Back pain     High cholesterol     Myocardial infarct (HCC)        Past Surgical History:   Procedure Laterality Date    APPENDECTOMY      CORONARY ANGIOPLASTY WITH STENT PLACEMENT         History reviewed. No pertinent family history.  I have reviewed and agree with the history as documented.    E-Cigarette/Vaping    E-Cigarette Use Never User      E-Cigarette/Vaping Substances     Social History     Tobacco Use    Smoking status: Every Day     Types: Cigarettes    Smokeless tobacco: Never   Vaping Use    Vaping status: Never Used   Substance Use Topics    Alcohol use: Not Currently    Drug use: Never       Review of Systems   Constitutional:  Negative for diaphoresis.   Respiratory:  Negative for cough and shortness of breath.    Cardiovascular:  Positive for chest pain. Negative for orthopnea, PND and near-syncope.   Musculoskeletal:  Negative for back pain.   Neurological:  Negative for dizziness.   All other systems reviewed and are negative.      Physical Exam  Physical Exam  Vitals and nursing note reviewed.   Constitutional:       General: He is in acute distress.      Appearance: He is well-developed.   HENT:      Head: Normocephalic and atraumatic.      Mouth/Throat:      Mouth: Mucous membranes are moist.   Eyes:      Extraocular Movements: Extraocular movements intact.      Pupils: Pupils are equal, round, and reactive to light.   Cardiovascular:      Rate and Rhythm: Normal rate and regular rhythm.      Heart sounds: No murmur heard.  Pulmonary:      Effort: Pulmonary effort is normal. No respiratory distress.      Breath sounds: Normal breath sounds.   Abdominal:      General: Bowel sounds are normal.      Palpations: Abdomen is soft.      Tenderness: There is no abdominal tenderness. There is no right CVA  tenderness or rebound.   Musculoskeletal:      Cervical back: Normal range of motion.      Right lower leg: No edema.      Left lower leg: No edema.   Skin:     General: Skin is warm and dry.      Capillary Refill: Capillary refill takes less than 2 seconds.   Neurological:      General: No focal deficit present.      Mental Status: He is alert and oriented to person, place, and time.   Psychiatric:         Behavior: Behavior normal.         Vital Signs  ED Triage Vitals [02/16/24 1040]   Temperature Pulse Respirations Blood Pressure SpO2   (!) 97.2 °F (36.2 °C) 55 16 156/73 99 %      Temp Source Heart Rate Source Patient Position - Orthostatic VS BP Location FiO2 (%)   Temporal Monitor Lying Right arm --      Pain Score       1           Vitals:    02/16/24 1230 02/16/24 1245 02/16/24 1300 02/16/24 1315   BP:  137/80 139/82 134/79   Pulse: (!) 48 (!) 49 (!) 50 (!) 49   Patient Position - Orthostatic VS:             Visual Acuity      ED Medications  Medications   nitroglycerin (NITROSTAT) SL tablet 0.4 mg (0 mg Sublingual Hold 2/16/24 1054)   aspirin chewable tablet 324 mg (324 mg Oral Given 2/16/24 1052)   magnesium sulfate IVPB (premix) SOLN 1 g (1 g Intravenous New Bag 2/16/24 1153)       Diagnostic Studies  Results Reviewed       Procedure Component Value Units Date/Time    HS Troponin I 2hr [[id number]]  (Normal) Collected: 02/16/24 1232    Lab Status: Final result Specimen: Blood from Arm, Right Updated: 02/16/24 1316     hs TnI 2hr 23 ng/L      Delta 2hr hsTnI -1 ng/L     UA w Reflex to Microscopic w Reflex to Culture [[id number]] Collected: 02/16/24 1232    Lab Status: Final result Specimen: Urine, Clean Catch Updated: 02/16/24 1247     Color, UA Yellow     Clarity, UA Clear     Specific Gravity, UA 1.015     pH, UA 7.5     Leukocytes, UA Negative     Nitrite, UA Negative     Protein, UA Negative mg/dl      Glucose, UA Negative mg/dl      Ketones, UA Negative mg/dl      Urobilinogen, UA 0.2 E.U./dl       Bilirubin, UA Negative     Occult Blood, UA Negative    TSH, 3rd generation with Free T4 reflex [[id number]]  (Normal) Collected: 02/16/24 1048    Lab Status: Final result Specimen: Blood from Arm, Left Updated: 02/16/24 1148     TSH 3RD GENERATON 2.759 uIU/mL     HS Troponin 0hr (reflex protocol) [[id number]]  (Normal) Collected: 02/16/24 1048    Lab Status: Final result Specimen: Blood from Arm, Left Updated: 02/16/24 1117     hs TnI 0hr 24 ng/L     Comprehensive metabolic panel [[id number]] Collected: 02/16/24 1048    Lab Status: Final result Specimen: Blood from Arm, Left Updated: 02/16/24 1111     Sodium 135 mmol/L      Potassium 3.9 mmol/L      Chloride 107 mmol/L      CO2 25 mmol/L      ANION GAP 3 mmol/L      BUN 14 mg/dL      Creatinine 0.87 mg/dL      Glucose 89 mg/dL      Calcium 9.2 mg/dL      AST 17 U/L      ALT 17 U/L      Alkaline Phosphatase 82 U/L      Total Protein 7.0 g/dL      Albumin 4.2 g/dL      Total Bilirubin 0.71 mg/dL      eGFR 95 ml/min/1.73sq m     Narrative:      [employer] Kid[employer] guidelines for Chronic Kidney Disease (CKD):     Stage 1 with normal or high GFR (GFR > 90 mL/min/1.73 square meters)    Stage 2 Mild CKD (GFR = 60-89 mL/min/1.73 square meters)    Stage 3A Moderate CKD (GFR = 45-59 mL/min/1.73 square meters)    Stage 3B Moderate CKD (GFR = 30-44 mL/min/1.73 square meters)    Stage 4 Severe CKD (GFR = 15-29 mL/min/1.73 square meters)    Stage 5 End Stage CKD (GFR <15 mL/min/1.73 square meters)  Note: GFR calculation is accurate only with a steady state creatinine    Magnesium [[id number]]  (Abnormal) Collected: 02/16/24 1048    Lab Status: Final result Specimen: Blood from Arm, Left Updated: 02/16/24 1111     Magnesium 1.8 mg/dL     Lactic acid, plasma (w/reflex if result > 2.0) [[id number]]  (Normal) Collected: 02/16/24 1048    Lab Status: Final result Specimen: Blood from Arm, Left Updated: 02/16/24 1110     LACTIC ACID 0.9 mmol/L     Narrative:      Result may  be elevated if tourniquet was used during collection.    CBC and differential [[id number]]  (Abnormal) Collected: 02/16/24 1048    Lab Status: Final result Specimen: Blood from Arm, Left Updated: 02/16/24 1053     WBC 10.89 Thousand/uL      RBC 4.77 Million/uL      Hemoglobin 15.0 g/dL      Hematocrit 44.2 %      MCV 93 fL      MCH 31.4 pg      MCHC 33.9 g/dL      RDW 12.4 %      MPV 10.3 fL      Platelets 260 Thousands/uL      nRBC 0 /100 WBCs      Neutrophils Relative 68 %      Immat GRANS % 0 %      Lymphocytes Relative 23 %      Monocytes Relative 7 %      Eosinophils Relative 1 %      Basophils Relative 1 %      Neutrophils Absolute 7.37 Thousands/µL      Immature Grans Absolute 0.03 Thousand/uL      Lymphocytes Absolute 2.48 Thousands/µL      Monocytes Absolute 0.80 Thousand/µL      Eosinophils Absolute 0.13 Thousand/µL      Basophils Absolute 0.08 Thousands/µL                    XR chest 1 view portable   ED Interpretation by Colin Barba PA-C (02/16 1208)   NAD                 Procedures  ECG 12 Lead Documentation Only    Date/Time: 2/16/2024 10:48 AM    Performed by: Colin Barba PA-C  Authorized by: Colin Barba PA-C    Indications / Diagnosis:  Cp  ECG reviewed by me, the ED Provider: yes    Patient location:  ED  Previous ECG:     Previous ECG:  Unavailable  Interpretation:     Interpretation: non-specific    Rate:     ECG rate:  56    ECG rate assessment: normal    Rhythm:     Rhythm: sinus bradycardia             ED Course  ED Course as of 02/16/24 1339   Fri Feb 16, 2024   1100 WBC(!): 10.89   1155 hs TnI 0hr: 24   1207 Reaching out to Nadine Zaman about patient just due to history of CAD    1332 And Nadine was contacted based on the second troponin.  At this time patient's been s asymptomatic while here and on cardiac monitoring no acute events.  At this time felt that the patient could be discharged with follow-up at home and the patient agreed with this plan would prefer to go  "home at this time.             HEART Risk Score      Flowsheet Row Most Recent Value   Heart Score Risk Calculator    History 0 Filed at: 02/16/2024 1321   ECG 0 Filed at: 02/16/2024 1321   Age 1 Filed at: 02/16/2024 1321   Risk Factors 2 Filed at: 02/16/2024 1321   Troponin 1 Filed at: 02/16/2024 1321   HEART Score 4 Filed at: 02/16/2024 1321                                        Medical Decision Making  Patient is a 58-year-old male PMH CAD, dyslipidemia who presents to the ED with the c/o chest pressure and palpitations.  The patient states that last evening around 7 PM at rest he felt his heart was racing.  States that he took a nitro .  This episode lasted 20 minutes seem to dissipate after the nitro.  Not have pain during this episode..  The patient states that he did not have any chest pain during this episode.  Patient states that this morning when he awoke around 7 AM he was having some chest pressure.  Did not take any aspirin this morning or any nitro.  Patient tried to contact his physician to make a sick appointment and was directed to the ER.  Patient states that the chest feels \"heavy\" 1 out of 10 sensation.  Denies any shortness of breath cough, fevers chills nausea vomiting dizziness back pain abdominal pain.  States that until Monday he was a daily smoker he has now started using nicotine patches.    H/o NSTEMI with stent placement 6/25/12    Patient remained stable even department on cardiac monitoring did not have any new events of palpitations or arrhythmias.  The patient had troponin x 2 performed and delta was unremarkable similar lab results x 2.  Patient's magnesium was slightly low.  Patient's heart score 4.  Did discuss with cardiology NP and who at this time felt patient could follow-up in the clinic closely with Dr. Lorenzo     Was offered observation if he felt uncomfortable going home and following up as an outpatient and he at this time would prefer discharge and felt comfortable " with outpatient follow-up.  Has seen this cardiologist before so no need for referral.    Differential Diagnosis included but was not limited to : Pneumonia, ACS, costochondritis, musculoskeletal pain, GERD, esophageal spasm, biliary colic       Amount and/or Complexity of Data Reviewed  External Data Reviewed: notes.  Labs: ordered. Decision-making details documented in ED Course.  Radiology: ordered and independent interpretation performed. Decision-making details documented in ED Course.  ECG/medicine tests: ordered and independent interpretation performed. Decision-making details documented in ED Course.  Discussion of management or test interpretation with external provider(s): Nadine Zaman NP with cardiology     Risk  OTC drugs.  Prescription drug management.             Disposition  Final diagnoses:   Chest pain   Palpitations   Hypomagnesemia     Time reflects when diagnosis was documented in both MDM as applicable and the Disposition within this note       Time User Action Codes Description Comment    2/16/2024 10:52 AM Colin Barba [R07.9] Chest pain     2/16/2024 10:52 AM Colin Barba [R00.2] Palpitations     2/16/2024  1:33 PM Colin Barba [E83.42] Hypomagnesemia           ED Disposition       ED Disposition   Discharge    Condition   Stable    Date/Time   Fri Feb 16, 2024 1333    Comment   Zac Cha discharge to home/self care.                   Follow-up Information       Follow up With Specialties Details Why Contact Info    Juni Lorenzo MD Cardiology Schedule an appointment as soon as possible for a visit   [address] [facility] [address]  [phone]              Patient's Medications   Discharge Prescriptions    MAGNESIUM GLUCONATE (MAGONATE) 500 MG TABLET    Take 1 tablet (500 mg total) by mouth 2 (two) times a day for 7 days       Start Date: 2/16/2024 End Date: 2/23/2024       Order Dose: 500 mg       Quantity: 14 tablet    Refills: 0       No discharge  procedures on file.    PDMP Review       None            ED Provider  Electronically Signed by             Colin Barba PA-C  02/16/24 0342

## 2024-11-01 ENCOUNTER — HOSPITAL ENCOUNTER (EMERGENCY)
Facility: HOSPITAL | Age: 59
Discharge: HOME | End: 2024-11-01
Attending: EMERGENCY MEDICINE
Payer: OTHER GOVERNMENT

## 2024-11-01 ENCOUNTER — APPOINTMENT (EMERGENCY)
Dept: RADIOLOGY | Facility: HOSPITAL | Age: 59
End: 2024-11-01
Payer: OTHER GOVERNMENT

## 2024-11-01 VITALS
OXYGEN SATURATION: 98 % | DIASTOLIC BLOOD PRESSURE: 95 MMHG | RESPIRATION RATE: 18 BRPM | HEART RATE: 59 BPM | TEMPERATURE: 97.5 F | SYSTOLIC BLOOD PRESSURE: 160 MMHG

## 2024-11-01 DIAGNOSIS — R07.9 CHEST PAIN: Primary | ICD-10-CM

## 2024-11-01 LAB
2HR DELTA HS TROPONIN: 0 NG/L
ALBUMIN SERPL BCG-MCNC: 4.2 G/DL (ref 3.5–5)
ALP SERPL-CCNC: 95 U/L (ref 34–104)
ALT SERPL W P-5'-P-CCNC: 14 U/L (ref 7–52)
ANION GAP SERPL CALCULATED.3IONS-SCNC: 5 MMOL/L (ref 4–13)
AST SERPL W P-5'-P-CCNC: 18 U/L (ref 13–39)
BASOPHILS # BLD AUTO: 0.07 THOUSANDS/ΜL (ref 0–0.1)
BASOPHILS NFR BLD AUTO: 1 % (ref 0–1)
BILIRUB SERPL-MCNC: 0.63 MG/DL (ref 0.2–1)
BNP SERPL-MCNC: 53 PG/ML (ref 0–100)
BUN SERPL-MCNC: 14 MG/DL (ref 5–25)
CALCIUM SERPL-MCNC: 9.1 MG/DL (ref 8.4–10.2)
CARDIAC TROPONIN I PNL SERPL HS: 8 NG/L
CARDIAC TROPONIN I PNL SERPL HS: 8 NG/L
CHLORIDE SERPL-SCNC: 103 MMOL/L (ref 96–108)
CO2 SERPL-SCNC: 27 MMOL/L (ref 21–32)
CREAT SERPL-MCNC: 0.94 MG/DL (ref 0.6–1.3)
D DIMER PPP FEU-MCNC: 0.35 UG/ML FEU
EOSINOPHIL # BLD AUTO: 0.22 THOUSAND/ΜL (ref 0–0.61)
EOSINOPHIL NFR BLD AUTO: 2 % (ref 0–6)
ERYTHROCYTE [DISTWIDTH] IN BLOOD BY AUTOMATED COUNT: 12.3 % (ref 11.6–15.1)
FLUAV AG UPPER RESP QL IA.RAPID: NEGATIVE
FLUBV AG UPPER RESP QL IA.RAPID: NEGATIVE
GFR SERPL CREATININE-BSD FRML MDRD: 88 ML/MIN/1.73SQ M
GLUCOSE SERPL-MCNC: 97 MG/DL (ref 65–140)
HCT VFR BLD AUTO: 41.6 % (ref 36.5–49.3)
HGB BLD-MCNC: 13.9 G/DL (ref 12–17)
IMM GRANULOCYTES # BLD AUTO: 0.04 THOUSAND/UL (ref 0–0.2)
IMM GRANULOCYTES NFR BLD AUTO: 0 % (ref 0–2)
LYMPHOCYTES # BLD AUTO: 3.36 THOUSANDS/ΜL (ref 0.6–4.47)
LYMPHOCYTES NFR BLD AUTO: 29 % (ref 14–44)
MCH RBC QN AUTO: 31.7 PG (ref 26.8–34.3)
MCHC RBC AUTO-ENTMCNC: 33.4 G/DL (ref 31.4–37.4)
MCV RBC AUTO: 95 FL (ref 82–98)
MONOCYTES # BLD AUTO: 0.79 THOUSAND/ΜL (ref 0.17–1.22)
MONOCYTES NFR BLD AUTO: 7 % (ref 4–12)
NEUTROPHILS # BLD AUTO: 7.24 THOUSANDS/ΜL (ref 1.85–7.62)
NEUTS SEG NFR BLD AUTO: 61 % (ref 43–75)
NRBC BLD AUTO-RTO: 0 /100 WBCS
PLATELET # BLD AUTO: 220 THOUSANDS/UL (ref 149–390)
PMV BLD AUTO: 10.3 FL (ref 8.9–12.7)
POTASSIUM SERPL-SCNC: 3.9 MMOL/L (ref 3.5–5.3)
PROT SERPL-MCNC: 6.8 G/DL (ref 6.4–8.4)
RBC # BLD AUTO: 4.38 MILLION/UL (ref 3.88–5.62)
SARS-COV+SARS-COV-2 AG RESP QL IA.RAPID: NEGATIVE
SODIUM SERPL-SCNC: 135 MMOL/L (ref 135–147)
WBC # BLD AUTO: 11.72 THOUSAND/UL (ref 4.31–10.16)

## 2024-11-01 PROCEDURE — 80053 COMPREHEN METABOLIC PANEL: CPT | Performed by: EMERGENCY MEDICINE

## 2024-11-01 PROCEDURE — 85379 FIBRIN DEGRADATION QUANT: CPT | Performed by: EMERGENCY MEDICINE

## 2024-11-01 PROCEDURE — 71046 X-RAY EXAM CHEST 2 VIEWS: CPT

## 2024-11-01 PROCEDURE — 99285 EMERGENCY DEPT VISIT HI MDM: CPT

## 2024-11-01 PROCEDURE — 84484 ASSAY OF TROPONIN QUANT: CPT | Performed by: EMERGENCY MEDICINE

## 2024-11-01 PROCEDURE — 83880 ASSAY OF NATRIURETIC PEPTIDE: CPT | Performed by: EMERGENCY MEDICINE

## 2024-11-01 PROCEDURE — 99285 EMERGENCY DEPT VISIT HI MDM: CPT | Performed by: EMERGENCY MEDICINE

## 2024-11-01 PROCEDURE — 85025 COMPLETE CBC W/AUTO DIFF WBC: CPT | Performed by: EMERGENCY MEDICINE

## 2024-11-01 PROCEDURE — 87811 SARS-COV-2 COVID19 W/OPTIC: CPT | Performed by: EMERGENCY MEDICINE

## 2024-11-01 PROCEDURE — 87804 INFLUENZA ASSAY W/OPTIC: CPT | Performed by: EMERGENCY MEDICINE

## 2024-11-01 PROCEDURE — 36415 COLL VENOUS BLD VENIPUNCTURE: CPT

## 2024-11-01 PROCEDURE — 94640 AIRWAY INHALATION TREATMENT: CPT

## 2024-11-01 PROCEDURE — 93005 ELECTROCARDIOGRAM TRACING: CPT

## 2024-11-01 RX ORDER — IPRATROPIUM BROMIDE AND ALBUTEROL SULFATE 2.5; .5 MG/3ML; MG/3ML
3 SOLUTION RESPIRATORY (INHALATION) ONCE
Status: COMPLETED | OUTPATIENT
Start: 2024-11-01 | End: 2024-11-01

## 2024-11-01 RX ADMIN — IPRATROPIUM BROMIDE AND ALBUTEROL SULFATE 3 ML: .5; 3 SOLUTION RESPIRATORY (INHALATION) at 13:31

## 2024-11-01 NOTE — ED PROVIDER NOTES
Time reflects when diagnosis was documented in both MDM as applicable and the Disposition within this note       Time User Action Codes Description Comment    11/1/2024  2:45 PM Arturo Daily Add [R07.9] Chest pain           ED Disposition       ED Disposition   Discharge    Condition   Stable    Date/Time   Fri Nov 1, 2024  2:45 PM    Comment   Zac Cha discharge to home/self care.                   Assessment & Plan       Medical Decision Making  Based on the history and medical screening exam performed the patient may be at risk for ACS, STEMI, NSTEMI, muscular chest pain, asthma, viral respiratory illness, seasonal allergies, pneumonia.    Based on the work-up performed in the emergency room which includes physical examination, and which may include laboratory studies and imaging as warranted including advanced imaging such as CT scan or ultrasound, the diagnostic considerations are narrowed to exclude limb or life-threatening process.    The patient is stable for discharge.  Workup in the emergency room is negative including EKG, serial troponins, chest x-ray.  Patient is improved after nebulization.  D-dimer also negative    Amount and/or Complexity of Data Reviewed  Labs: ordered. Decision-making details documented in ED Course.     Details: Serial troponins and D-dimer negative  Radiology: ordered and independent interpretation performed. Decision-making details documented in ED Course.     Details: Chest x-ray negative  ECG/medicine tests: ordered and independent interpretation performed. Decision-making details documented in ED Course.     Details: Sinus bradycardia rate 53    Risk  Prescription drug management.             Medications   ipratropium-albuterol (DUO-NEB) 0.5-2.5 mg/3 mL inhalation solution 3 mL (3 mL Nebulization Given 11/1/24 1331)       ED Risk Strat Scores   HEART Risk Score      Flowsheet Row Most Recent Value   Heart Score Risk Calculator    History 0 Filed at: 11/01/2024 2448    ECG 0 Filed at: 11/01/2024 1439   Age 1 Filed at: 11/01/2024 1439   Risk Factors 1 Filed at: 11/01/2024 1439   Troponin 0 Filed at: 11/01/2024 1439   HEART Score 2 Filed at: 11/01/2024 1439                               SBIRT 20yo+      Flowsheet Row Most Recent Value   Initial Alcohol Screen: US AUDIT-C     1. How often do you have a drink containing alcohol? 0 Filed at: 11/01/2024 1140   2. How many drinks containing alcohol do you have on a typical day you are drinking?  0 Filed at: 11/01/2024 1140   3a. Male UNDER 65: How often do you have five or more drinks on one occasion? 0 Filed at: 11/01/2024 1140   Audit-C Score 0 Filed at: 11/01/2024 1140   MARYURI: How many times in the past year have you...    Used an illegal drug or used a prescription medication for non-medical reasons? Never Filed at: 11/01/2024 1140                            History of Present Illness       Chief Complaint   Patient presents with    Shortness of Breath     Pt reports sudden onset of chest tightness one hour ago- hx of MI but denies chest pain        Past Medical History:   Diagnosis Date    Back pain     High cholesterol     Myocardial infarct (HCC)       Past Surgical History:   Procedure Laterality Date    APPENDECTOMY      CORONARY ANGIOPLASTY WITH STENT PLACEMENT        History reviewed. No pertinent family history.   Social History     Tobacco Use    Smoking status: Every Day     Types: Cigarettes    Smokeless tobacco: Never   Vaping Use    Vaping status: Never Used   Substance Use Topics    Alcohol use: Not Currently    Drug use: Never      E-Cigarette/Vaping    E-Cigarette Use Never User       E-Cigarette/Vaping Substances      I have reviewed and agree with the history as documented.     Patient complains of chest pressure and shortness of breath since 10 this morning.  Denies fevers or chills.  Denies nausea or vomiting.  No abdominal pain.  No other complaints.  States symptoms have improved somewhat since onset but have  not resolved.  Patient has prior history of heart attack and coronary artery disease currently only medications are aspirin and Lipitor.  Patient does smoke cigarettes.      History provided by:  Patient   used: No    Shortness of Breath  Severity:  Moderate  Onset quality:  Gradual  Duration:  3 hours  Timing:  Constant  Progression:  Unchanged  Chronicity:  New  Relieved by:  Nothing  Worsened by:  Nothing  Ineffective treatments:  None tried  Associated symptoms: no abdominal pain, no chest pain, no cough, no ear pain, no fever, no headaches, no neck pain, no rash, no sore throat, no syncope, no vomiting and no wheezing        Review of Systems   Constitutional:  Negative for chills and fever.   HENT:  Negative for ear pain, hearing loss, sore throat, trouble swallowing and voice change.    Eyes:  Negative for pain and discharge.   Respiratory:  Positive for shortness of breath. Negative for cough and wheezing.    Cardiovascular:  Negative for chest pain, palpitations and syncope.   Gastrointestinal:  Negative for abdominal pain, blood in stool, constipation, diarrhea, nausea and vomiting.   Genitourinary:  Negative for dysuria, flank pain, frequency and hematuria.   Musculoskeletal:  Negative for joint swelling, neck pain and neck stiffness.   Skin:  Negative for rash and wound.   Neurological:  Negative for dizziness, seizures, syncope, facial asymmetry and headaches.   Psychiatric/Behavioral:  Negative for hallucinations, self-injury and suicidal ideas.    All other systems reviewed and are negative.          Objective       ED Triage Vitals [11/01/24 1140]   Temperature Pulse Blood Pressure Respirations SpO2 Patient Position - Orthostatic VS   97.5 °F (36.4 °C) 59 160/95 18 98 % Sitting      Temp Source Heart Rate Source BP Location FiO2 (%) Pain Score    Temporal Monitor Left arm -- --      Vitals      Date and Time Temp Pulse SpO2 Resp BP Pain Score FACES Pain Rating User   11/01/24 1140  97.5 °F (36.4 °C) 59 98 % 18 160/95 -- -- SS            Physical Exam  Vitals and nursing note reviewed.   Constitutional:       General: He is not in acute distress.     Appearance: He is well-developed.   HENT:      Head: Normocephalic and atraumatic.      Right Ear: External ear normal.      Left Ear: External ear normal.   Eyes:      General: No scleral icterus.        Right eye: No discharge.         Left eye: No discharge.      Extraocular Movements: Extraocular movements intact.      Conjunctiva/sclera: Conjunctivae normal.   Cardiovascular:      Rate and Rhythm: Normal rate and regular rhythm.      Heart sounds: Normal heart sounds. No murmur heard.  Pulmonary:      Effort: Pulmonary effort is normal.      Breath sounds: Normal breath sounds. No decreased breath sounds, wheezing, rhonchi or rales.   Abdominal:      General: Bowel sounds are normal. There is no distension.      Palpations: Abdomen is soft.      Tenderness: There is no abdominal tenderness. There is no guarding or rebound.   Musculoskeletal:         General: No deformity. Normal range of motion.      Cervical back: Normal range of motion and neck supple.   Skin:     General: Skin is warm and dry.      Findings: No rash.   Neurological:      General: No focal deficit present.      Mental Status: He is alert and oriented to person, place, and time.      Cranial Nerves: No cranial nerve deficit.   Psychiatric:         Mood and Affect: Mood normal.         Behavior: Behavior normal.         Thought Content: Thought content normal.         Judgment: Judgment normal.         Results Reviewed       Procedure Component Value Units Date/Time    HS Troponin I 2hr [[id number]]  (Normal) Collected: 11/01/24 1343    Lab Status: Final result Specimen: Blood from Arm, Left Updated: 11/01/24 1425     hs TnI 2hr 8 ng/L      Delta 2hr hsTnI 0 ng/L     COVID-19/ Infleunza A/B Rapid Anitgen(30 min. TAT) [[id number]]  (Normal) Collected: 11/01/24 1303    Lab Status:  Final result Specimen: Nares from Nose Updated: 11/01/24 1324     SARS COV Rapid Antigen Negative     Influenza A Rapid Antigen Negative     Influenza B Rapid Antigen Negative    Narrative:      This test has been performed using the [employer] Liseth 2 FLU+SARS Antigen test under the Emergency Use Authorization (EUA). This test has been validated by the  and verified by the performing laboratory. The Liseth uses lateral flow immunofluorescent sandwich assay to detect SARS-COV, Influenza A and Influenza B Antigen.     The Quidel Liseth 2 SARS Antigen test does not differentiate between SARS-CoV and SARS-CoV-2.     Negative results are presumptive and may be confirmed with a molecular assay, if necessary, for patient management. Negative results do not rule out SARS-CoV-2 or influenza infection and should not be used as the sole basis for treatment or patient management decisions. A negative test result may occur if the level of antigen in a sample is below the limit of detection of this test.     Positive results are indicative of the presence of viral antigens, but do not rule out bacterial infection or co-infection with other viruses.     All test results should be used as an adjunct to clinical observations and other information available to the provider.    FOR PEDIATRIC PATIENTS - copy/paste COVID Guidelines URL to browser: [email or web address]    D-dimer, quantitative [[id number]]  (Normal) Collected: 11/01/24 1146    Lab Status: Final result Specimen: Blood from Arm, Right Updated: 11/01/24 1311     D-Dimer, Quant 0.35 ug/ml FEU     Narrative:      In the evaluation for possible pulmonary embolism, in the appropriate (Well's Score of 4 or less) patient, the age adjusted d-dimer cutoff for this patient can be calculated as:    Age x 0.01 (in ug/mL) for Age-adjusted D-dimer exclusion threshold for a patient over 50 years.    B-Type Natriuretic  Peptide(BNP) [[id number]]  (Normal) Collected: 11/01/24 1146    Lab Status: Final result Specimen: Blood from Arm, Right Updated: 11/01/24 1222     BNP 53 pg/mL     HS Troponin 0hr (reflex protocol) [[id number]]  (Normal) Collected: 11/01/24 1146    Lab Status: Final result Specimen: Blood from Arm, Right Updated: 11/01/24 1220     hs TnI 0hr 8 ng/L     Comprehensive metabolic panel [[id number]] Collected: 11/01/24 1146    Lab Status: Final result Specimen: Blood from Arm, Right Updated: 11/01/24 1212     Sodium 135 mmol/L      Potassium 3.9 mmol/L      Chloride 103 mmol/L      CO2 27 mmol/L      ANION GAP 5 mmol/L      BUN 14 mg/dL      Creatinine 0.94 mg/dL      Glucose 97 mg/dL      Calcium 9.1 mg/dL      AST 18 U/L      ALT 14 U/L      Alkaline Phosphatase 95 U/L      Total Protein 6.8 g/dL      Albumin 4.2 g/dL      Total Bilirubin 0.63 mg/dL      eGFR 88 ml/min/1.73sq m     Narrative:      National Kidney Disease Foundation guidelines for Chronic Kidney Disease (CKD):     Stage 1 with normal or high GFR (GFR > 90 mL/min/1.73 square meters)    Stage 2 Mild CKD (GFR = 60-89 mL/min/1.73 square meters)    Stage 3A Moderate CKD (GFR = 45-59 mL/min/1.73 square meters)    Stage 3B Moderate CKD (GFR = 30-44 mL/min/1.73 square meters)    Stage 4 Severe CKD (GFR = 15-29 mL/min/1.73 square meters)    Stage 5 End Stage CKD (GFR <15 mL/min/1.73 square meters)  Note: GFR calculation is accurate only with a steady state creatinine    CBC and differential [[id number]]  (Abnormal) Collected: 11/01/24 1146    Lab Status: Final result Specimen: Blood from Arm, Right Updated: 11/01/24 1156     WBC 11.72 Thousand/uL      RBC 4.38 Million/uL      Hemoglobin 13.9 g/dL      Hematocrit 41.6 %      MCV 95 fL      MCH 31.7 pg      MCHC 33.4 g/dL      RDW 12.3 %      MPV 10.3 fL      Platelets 220 Thousands/uL      nRBC 0 /100 WBCs      Segmented % 61 %      Immature Grans % 0 %      Lymphocytes % 29 %      Monocytes % 7 %      Eosinophils  Relative 2 %      Basophils Relative 1 %      Absolute Neutrophils 7.24 Thousands/µL      Absolute Immature Grans 0.04 Thousand/uL      Absolute Lymphocytes 3.36 Thousands/µL      Absolute Monocytes 0.79 Thousand/µL      Eosinophils Absolute 0.22 Thousand/µL      Basophils Absolute 0.07 Thousands/µL             XR chest pa and lateral   ED Interpretation by Arturo Daily MD (11/01 8238)   No acute finding          ECG 12 Lead Documentation Only    Date/Time: 11/1/2024 11:45 AM    Performed by: Arturo Daily MD  Authorized by: Arturo Daily MD    ECG reviewed by me, the ED Provider: yes    Patient location:  ED  Previous ECG:     Previous ECG:  Unavailable  Interpretation:     Interpretation: non-specific    Rate:     ECG rate:  53    ECG rate assessment: bradycardic    Rhythm:     Rhythm: sinus bradycardia    Ectopy:     Ectopy: none    QRS:     QRS axis:  Normal    QRS intervals:  Normal  Conduction:     Conduction: normal    ST segments:     ST segments:  Normal  T waves:     T waves: normal        ED Medication and Procedure Management   Prior to Admission Medications   Prescriptions Last Dose Informant Patient Reported? Taking?   aspirin (ECOTRIN LOW STRENGTH) 81 mg EC tablet   Yes No   Sig: Take 81 mg by mouth daily   atorvastatin (Lipitor) 40 mg tablet   Yes No   Sig: Take by mouth   magnesium gluconate (MAGONATE) 500 mg tablet   No No   Sig: Take 1 tablet (500 mg total) by mouth 2 (two) times a day for 7 days      Facility-Administered Medications: None     Discharge Medication List as of 11/1/2024  2:45 PM        CONTINUE these medications which have NOT CHANGED    Details   aspirin (ECOTRIN LOW STRENGTH) 81 mg EC tablet Take 81 mg by mouth daily, Historical Med      atorvastatin (Lipitor) 40 mg tablet Take by mouth, Starting Tue 12/22/2020, Historical Med      magnesium gluconate (MAGONATE) 500 mg tablet Take 1 tablet (500 mg total) by mouth 2 (two) times a day for 7 days, Starting Fri  2/16/2024, Until Fri 2/23/2024, Normal           No discharge procedures on file.  ED SEPSIS DOCUMENTATION   Time reflects when diagnosis was documented in both MDM as applicable and the Disposition within this note       Time User Action Codes Description Comment    11/1/2024  2:45 PM Arturo Daily Add [R07.9] Chest pain                  Arturo Daily MD  11/01/24 2048

## 2024-11-04 LAB
ATRIAL RATE: 53 BPM
P AXIS: 59 DEGREES
PR INTERVAL: 130 MS
QRS AXIS: 53 DEGREES
QRSD INTERVAL: 84 MS
QT INTERVAL: 426 MS
QTC INTERVAL: 399 MS
T WAVE AXIS: 61 DEGREES
VENTRICULAR RATE: 53 BPM

## 2024-11-04 PROCEDURE — 93010 ELECTROCARDIOGRAM REPORT: CPT | Performed by: INTERNAL MEDICINE

## 2024-12-13 ENCOUNTER — CONSULT (OUTPATIENT)
Dept: PAIN MEDICINE | Facility: CLINIC | Age: 59
End: 2024-12-13
Payer: OTHER GOVERNMENT

## 2024-12-13 ENCOUNTER — PREP FOR PROCEDURE (OUTPATIENT)
Dept: PAIN MEDICINE | Facility: CLINIC | Age: 59
End: 2024-12-13

## 2024-12-13 VITALS
SYSTOLIC BLOOD PRESSURE: 140 MMHG | DIASTOLIC BLOOD PRESSURE: 82 MMHG | RESPIRATION RATE: 18 BRPM | OXYGEN SATURATION: 97 % | HEIGHT: 70 IN | BODY MASS INDEX: 26.26 KG/M2 | HEART RATE: 58 BPM | WEIGHT: 183.4 LBS | TEMPERATURE: 96.9 F

## 2024-12-13 DIAGNOSIS — M54.16 LUMBAR RADICULOPATHY: Primary | ICD-10-CM

## 2024-12-13 DIAGNOSIS — M47.26 OTHER SPONDYLOSIS WITH RADICULOPATHY, LUMBAR REGION: Primary | ICD-10-CM

## 2024-12-13 PROCEDURE — 99244 OFF/OP CNSLTJ NEW/EST MOD 40: CPT | Performed by: ANESTHESIOLOGY

## 2024-12-13 RX ORDER — DIPHENOXYLATE HYDROCHLORIDE AND ATROPINE SULFATE 2.5; .025 MG/1; MG/1
1 TABLET ORAL DAILY
COMMUNITY

## 2024-12-13 RX ORDER — GABAPENTIN 300 MG/1
300 CAPSULE ORAL 3 TIMES DAILY
Qty: 90 CAPSULE | Refills: 2 | Status: SHIPPED | OUTPATIENT
Start: 2024-12-13

## 2024-12-13 NOTE — PATIENT INSTRUCTIONS
Patient Education     Core Strengthening Exercises on Back or on Hands and Knees   About this topic   Your core muscles are in your chest, back, buttock, and stomach area. They are your abdominal, back, and pelvis muscles. These muscles help keep your body stable when using your arms or legs. They also help with balance and posture. There are many exercises you can do to keep these muscles strong.  If you have back problems like a compression fracture or a ruptured disc, doing some of these exercises could make your problem worse. Some of these exercises may cause lower back pain.  General   Before starting with a program, ask your doctor if you are healthy enough to do these exercises. Your doctor may have you work with a , chiropractor, or physical therapist to make a safe exercise program to meet your needs.  Strengthening Exercises   Strengthening exercises keep your muscles firm and strong. Start by repeating each exercise 2 to 3 times. Work up to doing each exercise 10 times. Try to do the exercises 2 to 3 times each day. Hold each exercise for 3 to 5 seconds. Do all exercises slowly.  Hip lifts ? Lie on your back with your knees bent and feet flat on the floor. Tighten your stomach muscles and push your heels into the floor to lift your buttocks off the floor. Relax.  Pelvic tilts ? Lie on your back with your knees bent and feet flat on the floor. Tighten your stomach muscles and press your lower back down to the floor. Relax.  Straight leg raises lying down ? Lie on your back with one leg straight. Bend your other knee so the foot is flat on the bed. Keeping your leg straight, lift the leg up to the level of your other knee. Lower it back down. Repeat with the other leg.  Knee flex lying down ? Lie on your back with both knees bent and your feet flat on the floor. Tighten your belly muscles. Raise one leg up and back down as if you are marching in slow motion. Keep belly muscles tight while you move  your leg. Switch legs. To make this exercise harder, raise both arms straight up in the air. Tighten your belly muscles. When you raise one leg up, reach the opposite arm over your head. Switch, moving the opposite arm and leg until you have done 10 repetitions on each side.  Abdominal crunches ? Lie on your back with both knees bent. Keep your feet flat on the floor. Place your hands in one of these positions. Try starting with the first position since it is the easiest. As you get better, use the other positions to make it harder.  Crunches with arms at sides.  Crunches with arms across chest.  Crunches with arms behind head. Be careful not to interlock your fingers behind your neck or head while doing crunches. This may add tension to your neck and cause strain.  Look at the ceiling. Tighten your belly muscles and lift your shoulders and upper back off the floor. Breathe out while you are doing this. Lower your shoulders to the floor. Breathe in while you are doing this. Relax your belly muscles all the way before starting another crunch.  Arm and leg lifts on hands and knees ? Start on your hands and knees. With all of these exercises, keep your back as level as possible. If you are having trouble with this, you may want to put a small object on your back such as a book. If it falls off, you are not keeping your back level enough during the exercise.  Lift one arm up to shoulder level and hold. Lower it back down. Now, lift up the other arm and hold.  Lift one leg up and kick it straight out until it is in line with your back and hold. Lower it back down. Now, lift up the other leg and hold.  Lift one arm and the OPPOSITE leg up at the same time and hold. Lower them down. Now, repeat using the other arm and leg. This is a very hard exercise. It may take time to be able to do this.               What will the results be?   Stronger core  Better balance  More toned belly and back muscles  Easier to do daily  activities  Better sports performance  Less low back pain  Helpful tips   Stay active and work out to keep your muscles strong and flexible.  Keep a healthy weight to avoid putting too much stress on your spine. Eat a healthy diet to keep your muscles healthy.  Be sure you do not hold your breath when exercising. This can raise your blood pressure. If you tend to hold your breath, try counting out loud when exercising. If any exercise bothers you, stop right away.  Try walking or cycling at an easy pace for a few minutes to warm up your muscles. Do this again after exercising.  Exercise may be slightly uncomfortable, but you should not have sharp pains. If you do get sharp pains, stop what you are doing. If the sharp pains continue, call your doctor.  Last Reviewed Date   2021-03-18  Consumer Information Use and Disclaimer   This generalized information is a limited summary of diagnosis, treatment, and/or medication information. It is not meant to be comprehensive and should be used as a tool to help the user understand and/or assess potential diagnostic and treatment options. It does NOT include all information about conditions, treatments, medications, side effects, or risks that may apply to a specific patient. It is not intended to be medical advice or a substitute for the medical advice, diagnosis, or treatment of a health care provider based on the health care provider's examination and assessment of a patient’s specific and unique circumstances. Patients must speak with a health care provider for complete information about their health, medical questions, and treatment options, including any risks or benefits regarding use of medications. This information does not endorse any treatments or medications as safe, effective, or approved for treating a specific patient. [employer] and its affiliates disclaim any warranty or liability relating to this information or the use thereof. The use of this information  is governed by the Terms of Use, available at https://www.[employer].com/en/know/clinical-effectiveness-terms   Copyright   Copyright © 2024 [employer] and its affiliates and/or licensors. All rights reserved.

## 2024-12-13 NOTE — H&P (VIEW-ONLY)
Assessment  1. Other spondylosis with radiculopathy, lumbar region  -     gabapentin (NEURONTIN) 300 mg capsule; Take 1 capsule (300 mg total) by mouth 3 (three) times a day  -     Ambulatory referral to Physical Therapy; Future    Right sided lumbar radicular pain in the L5 dermatomal distribution accompanied by pain limited weakness numbness and paresthesias.  Patient has participated with PT years ago. Acute on chronic pain with decreased participation with IADLs over the past 3 months.  Has been taking OTC ibuprofen and tylenol with modest benefit.  5/5 strength bilaterally, positive SLR left-sided. Reflexes 2+.  Additionally there is positive facet loading,right greater than left. Denies any gait instability, saddle anesthesia. On imaging moderate to severe degenerative disc disease with spondyloarthropathy in facet joints most prominently seen at L5-S1 with right paracentral disc protrusion.  Risks, benefits alternatives epidural steroid injections thoroughly discussed with patient.  Handouts provided questions answered to patient's satisfaction.  Lifestyle modifications extensively discussed including diet, exercise and weight loss in addition to core strengthening.  Will proceed with multimodal pain therapy plan as noted below:    Plan  -L5-S1 LESI; f/u 2 weeks post procedure  -gabapentin 300 mg t.i.d. Ordered for patient; counseled regarding sedative effects of taking this medication and provided up titration calendar.  Counseled not to take medication while driving or operating heavy machinery/using stairs  -script provided for formal physical therapy for lumbar radiculopathy; Physician directed home exercise plan as per [employer] demonstrated and handouts provided that patient plans to participate with for 1 hour, twice a week for the next 6 weeks.       There are risks associated with opioid medications, including dependence, addiction and tolerance. The patient understands and agrees to use these  medications only as prescribed. Potential side effects of the medications include, but are not limited to, constipation, drowsiness, addiction, impaired judgment and risk of fatal overdose if not taken as prescribed. The patient was warned against driving while taking sedation medications or operating heavy machinery. The patient voiced understanding. Sharing medications is a felony. At this point in time, the patient is showing no signs of addiction, abuse, diversion or suicidal ideation.     [place] Prescription Drug Monitoring Program report was reviewed and was appropriate      Complete risks and benefits including bleeding, infection, tissue reaction, nerve injury and allergic reaction were discussed. The approach was demonstrated using models and literature was provided. Verbal and written consent was obtained.     My impressions and treatment recommendations were discussed in detail with the patient who verbalized understanding and had no further questions.  Discharge instructions were provided. I personally saw and examined the patient and I agree with the above discussed plan of care.    Review of external notes including PT notes, PCP notes and specialist notes was performed at this visit in addition to review of new ordered imaging and past imaging to develop or modify multidisciplinary pain plan    New Medications Ordered This Visit   Medications    multivitamin (THERAGRAN) TABS     Sig: Take 1 tablet by mouth daily       History of Present Illness    Zac Cha is a 59 y.o. male with pmhx of COPD (<1PPD smoker), XOL, borderline HTN, presenting with acute on chronic lumbar radicular pain in the right L5 dermatomal distribution. Debilitating pain limited weakness numbness and paresthesias accompany the pain. The patient rates the pain at a 8/10 accompanied by electric shock-like shooting features and crampy burning pain in the aforementioned dermatomal distributions.  The pain is worse in the  mornings as well as the end of the day; exertion such as walking for long periods of time seems to exacerbate the pain.  The patient can hardly walk more than a few blocks without having debilitating pain.  He tries to maintain an active lifestyle and finds that the current degree of pain seems to compromises his efforts.  The pain significantly impacts independent activities of daily living and contributes to significant disability.  He has attempted physical therapy with modest benefit years ago.  He has taken nsaids, tylenol with limited relief of the pain as well.  He has never tried epidural steroid injections in the past. He denies any bowel or bladder dysfunction/incontinence, saddle anesthesia or gait instability.    I have personally reviewed and/or updated the patient's past medical history, past surgical history, family history, social history, current medications, allergies, and vital signs today.     Review of Systems   Constitutional:  Positive for activity change.   HENT: Negative.     Eyes: Negative.    Respiratory: Negative.     Cardiovascular: Negative.    Gastrointestinal: Negative.    Endocrine: Negative.    Genitourinary: Negative.    Musculoskeletal:  Positive for arthralgias, back pain, gait problem and myalgias.   Skin: Negative.    Allergic/Immunologic: Negative.    Neurological:  Positive for weakness and numbness.   Hematological: Negative.    Psychiatric/Behavioral: Negative.     All other systems reviewed and are negative.      There is no problem list on file for this patient.      Past Medical History:   Diagnosis Date    Back pain     High cholesterol     Myocardial infarct (HCC)        Past Surgical History:   Procedure Laterality Date    APPENDECTOMY      CORONARY ANGIOPLASTY WITH STENT PLACEMENT         History reviewed. No pertinent family history.    Social History     Occupational History    Not on file   Tobacco Use    Smoking status: Every Day     Types: Cigarettes     "Smokeless tobacco: Never   Vaping Use    Vaping status: Never Used   Substance and Sexual Activity    Alcohol use: Not Currently    Drug use: Never    Sexual activity: Not on file       Current Outpatient Medications on File Prior to Visit   Medication Sig    aspirin (ECOTRIN LOW STRENGTH) 81 mg EC tablet Take 81 mg by mouth daily    atorvastatin (Lipitor) 40 mg tablet Take by mouth    multivitamin (THERAGRAN) TABS Take 1 tablet by mouth daily    magnesium gluconate (MAGONATE) 500 mg tablet Take 1 tablet (500 mg total) by mouth 2 (two) times a day for 7 days     No current facility-administered medications on file prior to visit.       No Known Allergies      Physical Exam    /82 (BP Location: Left arm, Patient Position: Sitting, Cuff Size: Adult)   Pulse 58   Temp (!) 96.9 °F (36.1 °C) (Temporal)   Resp 18   Ht 5' 10\" (1.778 m)   Wt 83.2 kg (183 lb 6.4 oz)   SpO2 97%   BMI 26.32 kg/m²     Constitutional: normal, well developed, well nourished, alert, in no distress and non-toxic and no overt pain behavior.  Eyes: anicteric  HEENT: grossly intact  Neck: supple, symmetric, trachea midline and no masses   Pulmonary:even and unlabored  Cardiovascular:No edema or pitting edema present  Skin:Normal without rashes or lesions and well hydrated  Psychiatric:Mood and affect appropriate  Neurologic:Cranial Nerves II-XII grossly intact Sensation grossly intact; no clonus negative wilde's. Reflexes 2+ and brisk. SLR + right sided  Musculoskeletal:normal gait. 5/5 strength bilaterally with AROM in lower extremities. Difficulty with normal heel toe and tip toe walking. Significant pain with lumbar facet loading bilaterally and with lateral spine rotation, ttp over lumbar paraspinal muscles, right greater than left. Negative lucian's test, negative gaenslen's negative SIJ loading bilaterally.    Imaging    MRI lumbar spine, without contrast     INDICATION: Spinal stenosis     COMPARISON: 10/17/2018     TECHNIQUE: " multiplanar multi sequential imaging performed of the lumbar spine,   without contrast     FINDINGS: There is mild straightening of the lumbar lordosis. There is disc   narrowing with anterior endplate spurring at L1-2 and L2-3. Is mild grade 1   retrolisthesis of L5 on S1 measuring approximately 0.5 cm.     The conus medullaris is normal in morphology, terminating at the L1 level.     Evaluation of the individual disc levels demonstrates the following:     L1-2, mild disc bulging. There is minimal impression of the ventral thecal sac.   Mild bilateral neural foraminal narrowing.     L2-3, very minimal disc bulge. Mild impression of the ventral thecal sac with   mild bilateral neural foraminal narrowing. No evidence of nerve root   compression.     L3-4, normal.     L4-5, minimal disc bulge and ligamentum flavum thickening facet arthropathy.   Mild central canal stenosis with mild to moderate left and minimal right neural   foraminal narrowing.     L5-S1, disc bulge with small right paracentral disc herniation. Is mild   impression of the ventral thecal sac. Minimal bilateral neural foraminal   narrowing.

## 2024-12-13 NOTE — LETTER
December 16, 2024     Yajaira Lyn PA-C  [address] [facility] [place] [address]    Patient: Zac Cha   YOB: 1965   Date of Visit: 12/13/2024       Dear Dr. Lyn:    Thank you for referring Zac Cha to me for evaluation. Below are my notes for this consultation.    If you have questions, please do not hesitate to call me. I look forward to following your patient along with you.         Sincerely,        Ferny Herbert MD        CC: No Recipients    Ferny Herbert MD  12/13/2024 11:53 AM  Signed      Assessment  1. Other spondylosis with radiculopathy, lumbar region  -     gabapentin (NEURONTIN) 300 mg capsule; Take 1 capsule (300 mg total) by mouth 3 (three) times a day  -     Ambulatory referral to Physical Therapy; Future    Right sided lumbar radicular pain in the L5 dermatomal distribution accompanied by pain limited weakness numbness and paresthesias.  Patient has participated with PT years ago. Acute on chronic pain with decreased participation with IADLs over the past 3 months.  Has been taking OTC ibuprofen and tylenol with modest benefit.  5/5 strength bilaterally, positive SLR left-sided. Reflexes 2+.  Additionally there is positive facet loading,right greater than left. Denies any gait instability, saddle anesthesia. On imaging moderate to severe degenerative disc disease with spondyloarthropathy in facet joints most prominently seen at L5-S1 with right paracentral disc protrusion.  Risks, benefits alternatives epidural steroid injections thoroughly discussed with patient.  Handouts provided questions answered to patient's satisfaction.  Lifestyle modifications extensively discussed including diet, exercise and weight loss in addition to core strengthening.  Will proceed with multimodal pain therapy plan as noted below:    Plan  -L5-S1 LESI; f/u 2 weeks post procedure  -gabapentin 300 mg t.i.d. Ordered for patient; counseled regarding sedative effects of taking  this medication and provided up titration calendar.  Counseled not to take medication while driving or operating heavy machinery/using stairs  -script provided for formal physical therapy for lumbar radiculopathy; Physician directed home exercise plan as per [employer] demonstrated and handouts provided that patient plans to participate with for 1 hour, twice a week for the next 6 weeks.       There are risks associated with opioid medications, including dependence, addiction and tolerance. The patient understands and agrees to use these medications only as prescribed. Potential side effects of the medications include, but are not limited to, constipation, drowsiness, addiction, impaired judgment and risk of fatal overdose if not taken as prescribed. The patient was warned against driving while taking sedation medications or operating heavy machinery. The patient voiced understanding. Sharing medications is a felony. At this point in time, the patient is showing no signs of addiction, abuse, diversion or suicidal ideation.     [place] Prescription Drug Monitoring Program report was reviewed and was appropriate      Complete risks and benefits including bleeding, infection, tissue reaction, nerve injury and allergic reaction were discussed. The approach was demonstrated using models and literature was provided. Verbal and written consent was obtained.     My impressions and treatment recommendations were discussed in detail with the patient who verbalized understanding and had no further questions.  Discharge instructions were provided. I personally saw and examined the patient and I agree with the above discussed plan of care.    Review of external notes including PT notes, PCP notes and specialist notes was performed at this visit in addition to review of new ordered imaging and past imaging to develop or modify multidisciplinary pain plan    New Medications Ordered This Visit   Medications   • multivitamin  (THERAGRAN) TABS     Sig: Take 1 tablet by mouth daily       History of Present Illness    Zac Cha is a 59 y.o. male with pmhx of COPD (<1PPD smoker), XOL, borderline HTN, presenting with acute on chronic lumbar radicular pain in the right L5 dermatomal distribution. Debilitating pain limited weakness numbness and paresthesias accompany the pain. The patient rates the pain at a 8/10 accompanied by electric shock-like shooting features and crampy burning pain in the aforementioned dermatomal distributions.  The pain is worse in the mornings as well as the end of the day; exertion such as walking for long periods of time seems to exacerbate the pain.  The patient can hardly walk more than a few blocks without having debilitating pain.  He tries to maintain an active lifestyle and finds that the current degree of pain seems to compromises his efforts.  The pain significantly impacts independent activities of daily living and contributes to significant disability.  He has attempted physical therapy with modest benefit years ago.  He has taken nsaids, tylenol with limited relief of the pain as well.  He has never tried epidural steroid injections in the past. He denies any bowel or bladder dysfunction/incontinence, saddle anesthesia or gait instability.    I have personally reviewed and/or updated the patient's past medical history, past surgical history, family history, social history, current medications, allergies, and vital signs today.     Review of Systems   Constitutional:  Positive for activity change.   HENT: Negative.     Eyes: Negative.    Respiratory: Negative.     Cardiovascular: Negative.    Gastrointestinal: Negative.    Endocrine: Negative.    Genitourinary: Negative.    Musculoskeletal:  Positive for arthralgias, back pain, gait problem and myalgias.   Skin: Negative.    Allergic/Immunologic: Negative.    Neurological:  Positive for weakness and numbness.   Hematological: Negative.   "  Psychiatric/Behavioral: Negative.     All other systems reviewed and are negative.      There is no problem list on file for this patient.      Past Medical History:   Diagnosis Date   • Back pain    • High cholesterol    • Myocardial infarct (HCC)        Past Surgical History:   Procedure Laterality Date   • APPENDECTOMY     • CORONARY ANGIOPLASTY WITH STENT PLACEMENT         History reviewed. No pertinent family history.    Social History     Occupational History   • Not on file   Tobacco Use   • Smoking status: Every Day     Types: Cigarettes   • Smokeless tobacco: Never   Vaping Use   • Vaping status: Never Used   Substance and Sexual Activity   • Alcohol use: Not Currently   • Drug use: Never   • Sexual activity: Not on file       Current Outpatient Medications on File Prior to Visit   Medication Sig   • aspirin (ECOTRIN LOW STRENGTH) 81 mg EC tablet Take 81 mg by mouth daily   • atorvastatin (Lipitor) 40 mg tablet Take by mouth   • multivitamin (THERAGRAN) TABS Take 1 tablet by mouth daily   • magnesium gluconate (MAGONATE) 500 mg tablet Take 1 tablet (500 mg total) by mouth 2 (two) times a day for 7 days     No current facility-administered medications on file prior to visit.       No Known Allergies      Physical Exam    /82 (BP Location: Left arm, Patient Position: Sitting, Cuff Size: Adult)   Pulse 58   Temp (!) 96.9 °F (36.1 °C) (Temporal)   Resp 18   Ht 5' 10\" (1.778 m)   Wt 83.2 kg (183 lb 6.4 oz)   SpO2 97%   BMI 26.32 kg/m²     Constitutional: normal, well developed, well nourished, alert, in no distress and non-toxic and no overt pain behavior.  Eyes: anicteric  HEENT: grossly intact  Neck: supple, symmetric, trachea midline and no masses   Pulmonary:even and unlabored  Cardiovascular:No edema or pitting edema present  Skin:Normal without rashes or lesions and well hydrated  Psychiatric:Mood and affect appropriate  Neurologic:Cranial Nerves II-XII grossly intact Sensation grossly " intact; no clonus negative wilde's. Reflexes 2+ and brisk. SLR + right sided  Musculoskeletal:normal gait. 5/5 strength bilaterally with AROM in lower extremities. Difficulty with normal heel toe and tip toe walking. Significant pain with lumbar facet loading bilaterally and with lateral spine rotation, ttp over lumbar paraspinal muscles, right greater than left. Negative lucian's test, negative gaenslen's negative SIJ loading bilaterally.    Imaging    MRI lumbar spine, without contrast     INDICATION: Spinal stenosis     COMPARISON: 10/17/2018     TECHNIQUE: multiplanar multi sequential imaging performed of the lumbar spine,   without contrast     FINDINGS: There is mild straightening of the lumbar lordosis. There is disc   narrowing with anterior endplate spurring at L1-2 and L2-3. Is mild grade 1   retrolisthesis of L5 on S1 measuring approximately 0.5 cm.     The conus medullaris is normal in morphology, terminating at the L1 level.     Evaluation of the individual disc levels demonstrates the following:     L1-2, mild disc bulging. There is minimal impression of the ventral thecal sac.   Mild bilateral neural foraminal narrowing.     L2-3, very minimal disc bulge. Mild impression of the ventral thecal sac with   mild bilateral neural foraminal narrowing. No evidence of nerve root   compression.     L3-4, normal.     L4-5, minimal disc bulge and ligamentum flavum thickening facet arthropathy.   Mild central canal stenosis with mild to moderate left and minimal right neural   foraminal narrowing.     L5-S1, disc bulge with small right paracentral disc herniation. Is mild   impression of the ventral thecal sac. Minimal bilateral neural foraminal   narrowing.

## 2024-12-31 ENCOUNTER — HOSPITAL ENCOUNTER (OUTPATIENT)
Dept: INTERVENTIONAL RADIOLOGY/VASCULAR | Facility: HOSPITAL | Age: 59
Discharge: HOME | End: 2024-12-31
Attending: ANESTHESIOLOGY
Payer: OTHER GOVERNMENT

## 2024-12-31 VITALS
TEMPERATURE: 97.5 F | HEART RATE: 58 BPM | RESPIRATION RATE: 18 BRPM | SYSTOLIC BLOOD PRESSURE: 170 MMHG | DIASTOLIC BLOOD PRESSURE: 84 MMHG | OXYGEN SATURATION: 98 %

## 2024-12-31 DIAGNOSIS — M54.16 LUMBAR RADICULOPATHY: ICD-10-CM

## 2024-12-31 PROCEDURE — 62323 NJX INTERLAMINAR LMBR/SAC: CPT | Performed by: ANESTHESIOLOGY

## 2024-12-31 RX ORDER — METHYLPREDNISOLONE ACETATE 80 MG/ML
INJECTION, SUSPENSION INTRA-ARTICULAR; INTRALESIONAL; INTRAMUSCULAR; PARENTERAL; SOFT TISSUE AS NEEDED
Status: COMPLETED | OUTPATIENT
Start: 2024-12-31 | End: 2024-12-31

## 2024-12-31 RX ORDER — LIDOCAINE HYDROCHLORIDE 10 MG/ML
INJECTION, SOLUTION EPIDURAL; INFILTRATION; INTRACAUDAL; PERINEURAL AS NEEDED
Status: COMPLETED | OUTPATIENT
Start: 2024-12-31 | End: 2024-12-31

## 2024-12-31 RX ORDER — 0.9 % SODIUM CHLORIDE 0.9 %
VIAL (ML) INJECTION AS NEEDED
Status: COMPLETED | OUTPATIENT
Start: 2024-12-31 | End: 2024-12-31

## 2024-12-31 RX ADMIN — IOHEXOL 1 ML: 240 INJECTION, SOLUTION INTRATHECAL; INTRAVASCULAR; INTRAVENOUS; ORAL at 10:16

## 2024-12-31 RX ADMIN — LIDOCAINE HYDROCHLORIDE 5 ML: 10 INJECTION, SOLUTION EPIDURAL; INFILTRATION; INTRACAUDAL; PERINEURAL at 10:15

## 2024-12-31 RX ADMIN — SODIUM CHLORIDE 3 ML: 9 INJECTION INTRAMUSCULAR; INTRAVENOUS; SUBCUTANEOUS at 10:16

## 2024-12-31 RX ADMIN — METHYLPREDNISOLONE ACETATE 80 MG: 80 INJECTION, SUSPENSION INTRA-ARTICULAR; INTRALESIONAL; INTRAMUSCULAR; SOFT TISSUE at 10:16

## 2024-12-31 NOTE — INTERVAL H&P NOTE
H&P reviewed. After examining the patient I find no changes in the patients condition since the H&P had been written.    Vitals:    12/31/24 1004   BP: 163/87   Pulse: (!) 52   Resp: 16   Temp: (!) 97 °F (36.1 °C)   SpO2: 96%

## 2024-12-31 NOTE — DISCHARGE INSTR - AVS FIRST PAGE
YOUR 2 WEEK FOLLOW UP HAS BEEN SCHEDULED; IF YOU WISH TO CHANGE THE FOLLOW UP, PLEASE CALL THE [facility] AT [place]: [phone]    EPIDURAL STEROID INJECTION DISCHARGE INSTRUCTIONS  WHAT YOU NEED TO KNOW:   An epidural steroid injection (ASHLEY) is a procedure to inject steroid medicine into the epidural space. The epidural space is between your spinal cord and vertebrae. Steroids reduce inflammation and fluid buildup in your spine that may be causing pain. You may be given pain medicine along with the steroids.        DISCHARGE INSTRUCTIONS:   Call your local emergency number (911 in the [place]) if:   You have a seizure.    You have trouble moving your legs.    Seek care immediately if:   Blood soaks through your bandage.    You have a fever or chills, severe back pain, and the procedure area is sensitive to the touch.    You cannot control when you urinate or have a bowel movement.    Call your doctor if:   You have weakness or numbness in your legs.    Your wound is red, swollen, or draining pus.    You have nausea or are vomiting.    Your face or neck is red and you feel warm.    You have more pain than you had before the procedure.    You have swelling in your hands or feet.    You have questions or concerns about your condition or care.    Care for your wound as directed:  You may remove the bandage before you go to bed the day of your procedure. You may take a shower, but do not take a bath for at least 24 hours.   Self-care:   Do not drive,  use machines, or do strenuous activity for 24 hours after your procedure or as directed.     Continue other treatments  as directed. Steroid injections alone will not control your pain. The injections are meant to be used with other treatments, such as physical therapy.    Follow up with your doctor as directed:  Write down your questions so you remember to ask them during your visits.           ACTIVITY  Do not drive or operate machinery today.  No strenuous  activity today - bending, lifting, etc.   You may resume normal activities starting tomorrow - start slowly and as tolerated.  You may shower today, but not tub baths or hot tubs.  You may have numbness for several hours from the local anesthetics. Please use caution and common sense, especially with weight-bearing activities.    CARE OF THE INJECTION SITE  If you have soreness or pain apply ice to the area today (20 minutes on and 20 minutes off).  Starting tomorrow, you may resume normal activities  Notify the [facility] if you have any of the following: redness, drainage, swelling or fever above 100°F.      MEDICATIONS  Continue to take all routine medications.  Our office may have instructed you to hold some medications. You may restart medications, including blood thinners.

## 2025-06-08 ENCOUNTER — HOSPITAL ENCOUNTER (EMERGENCY)
Facility: HOSPITAL | Age: 60
Discharge: HOME | End: 2025-06-08
Attending: EMERGENCY MEDICINE
Payer: OTHER GOVERNMENT

## 2025-06-08 ENCOUNTER — APPOINTMENT (EMERGENCY)
Dept: RADIOLOGY | Facility: HOSPITAL | Age: 60
End: 2025-06-08
Payer: OTHER GOVERNMENT

## 2025-06-08 VITALS
SYSTOLIC BLOOD PRESSURE: 118 MMHG | WEIGHT: 181 LBS | BODY MASS INDEX: 25.97 KG/M2 | OXYGEN SATURATION: 97 % | RESPIRATION RATE: 20 BRPM | HEART RATE: 64 BPM | TEMPERATURE: 96.8 F | DIASTOLIC BLOOD PRESSURE: 66 MMHG

## 2025-06-08 DIAGNOSIS — J98.01 BRONCHOSPASM: ICD-10-CM

## 2025-06-08 DIAGNOSIS — L50.9 HIVES: ICD-10-CM

## 2025-06-08 DIAGNOSIS — T78.40XA ACUTE ALLERGIC REACTION, INITIAL ENCOUNTER: Primary | ICD-10-CM

## 2025-06-08 DIAGNOSIS — R07.9 CHEST PAIN, UNSPECIFIED TYPE: ICD-10-CM

## 2025-06-08 LAB
2HR DELTA HS TROPONIN: 10 NG/L
ALBUMIN SERPL BCG-MCNC: 3.9 G/DL (ref 3.5–5)
ALP SERPL-CCNC: 100 U/L (ref 34–104)
ALT SERPL W P-5'-P-CCNC: 35 U/L (ref 7–52)
ANION GAP SERPL CALCULATED.3IONS-SCNC: 9 MMOL/L (ref 4–13)
AST SERPL W P-5'-P-CCNC: 29 U/L (ref 13–39)
BASOPHILS # BLD AUTO: 0.04 THOUSANDS/ÂΜL (ref 0–0.1)
BASOPHILS NFR BLD AUTO: 1 % (ref 0–1)
BILIRUB SERPL-MCNC: 0.45 MG/DL (ref 0.2–1)
BUN SERPL-MCNC: 15 MG/DL (ref 5–25)
CALCIUM SERPL-MCNC: 9.2 MG/DL (ref 8.4–10.2)
CARDIAC TROPONIN I PNL SERPL HS: 10 NG/L (ref ?–50)
CARDIAC TROPONIN I PNL SERPL HS: 20 NG/L (ref ?–50)
CHLORIDE SERPL-SCNC: 107 MMOL/L (ref 96–108)
CO2 SERPL-SCNC: 20 MMOL/L (ref 21–32)
CREAT SERPL-MCNC: 0.96 MG/DL (ref 0.6–1.3)
EOSINOPHIL # BLD AUTO: 0.09 THOUSAND/ÂΜL (ref 0–0.61)
EOSINOPHIL NFR BLD AUTO: 1 % (ref 0–6)
ERYTHROCYTE [DISTWIDTH] IN BLOOD BY AUTOMATED COUNT: 12.1 % (ref 11.6–15.1)
GFR SERPL CREATININE-BSD FRML MDRD: 86 ML/MIN/1.73SQ M
GLUCOSE SERPL-MCNC: 115 MG/DL (ref 65–140)
HCT VFR BLD AUTO: 46.9 % (ref 36.5–49.3)
HGB BLD-MCNC: 15.9 G/DL (ref 12–17)
IMM GRANULOCYTES # BLD AUTO: 0.03 THOUSAND/UL (ref 0–0.2)
IMM GRANULOCYTES NFR BLD AUTO: 0 % (ref 0–2)
LYMPHOCYTES # BLD AUTO: 2.52 THOUSANDS/ÂΜL (ref 0.6–4.47)
LYMPHOCYTES NFR BLD AUTO: 30 % (ref 14–44)
MCH RBC QN AUTO: 32.1 PG (ref 26.8–34.3)
MCHC RBC AUTO-ENTMCNC: 33.9 G/DL (ref 31.4–37.4)
MCV RBC AUTO: 95 FL (ref 82–98)
MONOCYTES # BLD AUTO: 0.47 THOUSAND/ÂΜL (ref 0.17–1.22)
MONOCYTES NFR BLD AUTO: 6 % (ref 4–12)
NEUTROPHILS # BLD AUTO: 5.2 THOUSANDS/ÂΜL (ref 1.85–7.62)
NEUTS SEG NFR BLD AUTO: 62 % (ref 43–75)
NRBC BLD AUTO-RTO: 0 /100 WBCS
PLATELET # BLD AUTO: 236 THOUSANDS/UL (ref 149–390)
PMV BLD AUTO: 10.7 FL (ref 8.9–12.7)
POTASSIUM SERPL-SCNC: 4.3 MMOL/L (ref 3.5–5.3)
PROT SERPL-MCNC: 6.6 G/DL (ref 6.4–8.4)
RBC # BLD AUTO: 4.95 MILLION/UL (ref 3.88–5.62)
SODIUM SERPL-SCNC: 136 MMOL/L (ref 135–147)
WBC # BLD AUTO: 8.35 THOUSAND/UL (ref 4.31–10.16)

## 2025-06-08 PROCEDURE — 94644 CONT INHLJ TX 1ST HOUR: CPT

## 2025-06-08 PROCEDURE — 99284 EMERGENCY DEPT VISIT MOD MDM: CPT

## 2025-06-08 PROCEDURE — 96374 THER/PROPH/DIAG INJ IV PUSH: CPT

## 2025-06-08 PROCEDURE — 99285 EMERGENCY DEPT VISIT HI MDM: CPT | Performed by: EMERGENCY MEDICINE

## 2025-06-08 PROCEDURE — 85025 COMPLETE CBC W/AUTO DIFF WBC: CPT | Performed by: EMERGENCY MEDICINE

## 2025-06-08 PROCEDURE — 80053 COMPREHEN METABOLIC PANEL: CPT | Performed by: EMERGENCY MEDICINE

## 2025-06-08 PROCEDURE — 96361 HYDRATE IV INFUSION ADD-ON: CPT

## 2025-06-08 PROCEDURE — 36415 COLL VENOUS BLD VENIPUNCTURE: CPT | Performed by: EMERGENCY MEDICINE

## 2025-06-08 PROCEDURE — 71045 X-RAY EXAM CHEST 1 VIEW: CPT

## 2025-06-08 PROCEDURE — 96375 TX/PRO/DX INJ NEW DRUG ADDON: CPT

## 2025-06-08 PROCEDURE — 93005 ELECTROCARDIOGRAM TRACING: CPT

## 2025-06-08 PROCEDURE — 84484 ASSAY OF TROPONIN QUANT: CPT | Performed by: EMERGENCY MEDICINE

## 2025-06-08 PROCEDURE — 94640 AIRWAY INHALATION TREATMENT: CPT

## 2025-06-08 RX ORDER — FAMOTIDINE 10 MG/ML
20 INJECTION, SOLUTION INTRAVENOUS ONCE
Status: COMPLETED | OUTPATIENT
Start: 2025-06-08 | End: 2025-06-08

## 2025-06-08 RX ORDER — SODIUM CHLORIDE FOR INHALATION 0.9 %
12 VIAL, NEBULIZER (ML) INHALATION ONCE
Status: COMPLETED | OUTPATIENT
Start: 2025-06-08 | End: 2025-06-08

## 2025-06-08 RX ORDER — ALBUTEROL SULFATE 5 MG/ML
10 SOLUTION RESPIRATORY (INHALATION) ONCE
Status: COMPLETED | OUTPATIENT
Start: 2025-06-08 | End: 2025-06-08

## 2025-06-08 RX ORDER — ALBUTEROL SULFATE 90 UG/1
2 INHALANT RESPIRATORY (INHALATION) EVERY 6 HOURS PRN
Qty: 6.7 G | Refills: 0 | Status: SHIPPED | OUTPATIENT
Start: 2025-06-08

## 2025-06-08 RX ORDER — PREDNISONE 20 MG/1
60 TABLET ORAL DAILY
Qty: 15 TABLET | Refills: 0 | Status: SHIPPED | OUTPATIENT
Start: 2025-06-09

## 2025-06-08 RX ORDER — METHYLPREDNISOLONE SODIUM SUCCINATE 125 MG/2ML
125 INJECTION, POWDER, LYOPHILIZED, FOR SOLUTION INTRAMUSCULAR; INTRAVENOUS ONCE
Status: COMPLETED | OUTPATIENT
Start: 2025-06-08 | End: 2025-06-08

## 2025-06-08 RX ORDER — DIPHENHYDRAMINE HYDROCHLORIDE 50 MG/ML
50 INJECTION, SOLUTION INTRAMUSCULAR; INTRAVENOUS ONCE
Status: COMPLETED | OUTPATIENT
Start: 2025-06-08 | End: 2025-06-08

## 2025-06-08 RX ADMIN — SODIUM CHLORIDE 1000 ML: 0.9 INJECTION, SOLUTION INTRAVENOUS at 09:25

## 2025-06-08 RX ADMIN — ALBUTEROL SULFATE 10 MG: 2.5 SOLUTION RESPIRATORY (INHALATION) at 09:34

## 2025-06-08 RX ADMIN — IPRATROPIUM BROMIDE 1 MG: 0.5 SOLUTION RESPIRATORY (INHALATION) at 09:33

## 2025-06-08 RX ADMIN — METHYLPREDNISOLONE SODIUM SUCCINATE 125 MG: 125 INJECTION, POWDER, FOR SOLUTION INTRAMUSCULAR; INTRAVENOUS at 09:30

## 2025-06-08 RX ADMIN — ISODIUM CHLORIDE 12 ML: 0.03 SOLUTION RESPIRATORY (INHALATION) at 09:33

## 2025-06-08 RX ADMIN — DIPHENHYDRAMINE HYDROCHLORIDE 50 MG: 50 INJECTION, SOLUTION INTRAMUSCULAR; INTRAVENOUS at 09:27

## 2025-06-08 RX ADMIN — FAMOTIDINE 20 MG: 10 INJECTION, SOLUTION INTRAVENOUS at 09:28

## 2025-06-08 NOTE — ED PROVIDER NOTES
Time reflects when diagnosis was documented in both MDM as applicable and the Disposition within this note       Time User Action Codes Description Comment    6/8/2025 12:14 PM Marco Antonio Fernandez [T78.40XA] Acute allergic reaction, initial encounter     6/8/2025 12:14 PM Marco Antonio Fernandez [L50.9] Hives     6/8/2025 12:14 PM Marco Antonio Fernandez [J98.01] Bronchospasm     6/8/2025 12:14 PM Marco Antonio Fernandez [R07.9] Chest pain, unspecified type           ED Disposition       ED Disposition   Discharge    Condition   Stable    Date/Time   Sun Jun 8, 2025 12:14 PM    Comment   Zac Cha discharge to home/self care.                   Assessment & Plan       Medical Decision Making  Patient was seen and evaluated for his presentation as outlined.  Patient at risk for acute coronary syndrome, allergic reaction, anaphylaxis, bronchospasm, respiratory failure, pulmonary infection, other.  Clinical presentation most consistent with acute allergic reaction with associated bronchospasm.  Medicated with IV steroids, hour-long DuoNeb, Benadryl, Pepcid with improvement in symptoms.  EKG appears normal.  Initial troponin 10, repeat troponin at 2 hours increased to 20, still within normal range.  Patient's heart score is 4.  Discussed potential 4-hour troponin, patient would prefer to go home.  Did advise close follow-up with cardiology, and reviewed strict return precautions.  Suspect slight increased metabolic demand as cause to change in troponin.  However, doubt acute coronary syndrome.  Patient is stable for discharge from the emergency department.  All questions answered.    Amount and/or Complexity of Data Reviewed  Labs: ordered.  Radiology: ordered and independent interpretation performed.  ECG/medicine tests: ordered and independent interpretation performed.     Details: EKG by my interpretation demonstrates sinus bradycardia at a ventricular rate of 49 bpm.  Normal axis, normal intervals.  No acute ST ovation's or T  wave inversions.  Compared with prior EKG from November 1, 2024, no significant changes are noted.    Risk  Prescription drug management.             Medications   sodium chloride 0.9 % bolus 1,000 mL (0 mL Intravenous Stopped 6/8/25 1025)   diphenhydrAMINE (BENADRYL) injection 50 mg (50 mg Intravenous Given 6/8/25 0927)   Famotidine (PF) (PEPCID) injection 20 mg (20 mg Intravenous Given 6/8/25 0928)   methylPREDNISolone sodium succinate (Solu-MEDROL) injection 125 mg (125 mg Intravenous Given 6/8/25 0930)   albuterol inhalation solution 10 mg (10 mg Nebulization Given 6/8/25 0934)   ipratropium (ATROVENT) 0.02 % inhalation solution 1 mg (1 mg Nebulization Given 6/8/25 0933)   sodium chloride 0.9 % inhalation solution 12 mL (12 mL Nebulization Given 6/8/25 0933)       ED Risk Strat Scores   HEART Risk Score      Flowsheet Row Most Recent Value   Heart Score Risk Calculator    History 0 Filed at: 06/08/2025 1152   ECG 0 Filed at: 06/08/2025 1152   Age 1 Filed at: 06/08/2025 1152   Risk Factors 2 Filed at: 06/08/2025 1152   Troponin 1 Filed at: 06/08/2025 1152   HEART Score 4 Filed at: 06/08/2025 1152          HEART Risk Score      Flowsheet Row Most Recent Value   Heart Score Risk Calculator    History 0 Filed at: 06/08/2025 1152   ECG 0 Filed at: 06/08/2025 1152   Age 1 Filed at: 06/08/2025 1152   Risk Factors 2 Filed at: 06/08/2025 1152   Troponin 1 Filed at: 06/08/2025 1152   HEART Score 4 Filed at: 06/08/2025 1152                      No data recorded        SBIRT 22yo+      Flowsheet Row Most Recent Value   Initial Alcohol Screen: US AUDIT-C     1. How often do you have a drink containing alcohol? 0 Filed at: 06/08/2025 0903   2. How many drinks containing alcohol do you have on a typical day you are drinking?  0 Filed at: 06/08/2025 0903   3a. Male UNDER 65: How often do you have five or more drinks on one occasion? 0 Filed at: 06/08/2025 0903   Audit-C Score 0 Filed at: 06/08/2025 0903   MARYURI: How many times  in the past year have you...    Used an illegal drug or used a prescription medication for non-medical reasons? Never Filed at: 06/08/2025 0903                            History of Present Illness       Chief Complaint   Patient presents with    Shortness of Breath     Patient presents to the ED with complaints of shortness of breath and chest pain that began this morning. The patient also reports itching and hives that began this morning.        Past Medical History[1]   Past Surgical History[2]   Family History[3]   Social History[4]   E-Cigarette/Vaping    E-Cigarette Use Never User       E-Cigarette/Vaping Substances      I have reviewed and agree with the history as documented.     Patient presents the emergency department accompanied by his wife for evaluation of chest tightness, shortness of breath, hives that he noticed when he woke up about 30 minutes prior to arrival.  Denies prior similar symptoms.  No known new exposures.  States he was outside at a party yesterday, but did not notice any new exposure.  Denies any fevers or chills.  Has not taken anything prior to arrival.  Denies any lip, tongue, or throat swelling.  No trouble swallowing.  No other complaints, modifying factors, or associated symptoms.        Review of Systems   All other systems reviewed and are negative.          Objective       ED Triage Vitals [06/08/25 0901]   Temperature Pulse Blood Pressure Respirations SpO2 Patient Position - Orthostatic VS   (!) 96.8 °F (36 °C) (!) 48 129/59 20 94 % Lying      Temp Source Heart Rate Source BP Location FiO2 (%) Pain Score    Temporal Monitor Right arm -- 3      Vitals      Date and Time Temp Pulse SpO2 Resp BP Pain Score FACES Pain Rating User   06/08/25 1130 -- 63 97 % 19 -- -- --    06/08/25 1115 -- 65 97 % 20 115/67 -- --    06/08/25 1100 -- 65 96 % 22 126/60 -- --    06/08/25 1045 -- 59 97 % 17 137/67 -- --    06/08/25 1030 -- 57 100 % 14 125/58 -- --    06/08/25 1015 -- 54 100 %  12 127/62 -- --    06/08/25 1000 -- 47 100 % 19 135/58 -- --    06/08/25 0945 -- 44 100 % 18 135/62 -- --    06/08/25 0930 -- 47 95 % 19 110/55 -- --    06/08/25 0915 -- 52 90 % 22 102/50 -- --    06/08/25 0901 96.8 °F (36 °C) 48 94 % 20 129/59 3 -- RR            Physical Exam  Vitals and nursing note reviewed.   Constitutional:       General: He is not in acute distress.     Appearance: He is well-developed. He is not ill-appearing.   HENT:      Head: Normocephalic and atraumatic.     Eyes:      Conjunctiva/sclera: Conjunctivae normal.       Cardiovascular:      Rate and Rhythm: Normal rate and regular rhythm.      Heart sounds: No murmur heard.     No friction rub. No gallop.   Pulmonary:      Effort: Pulmonary effort is normal. No respiratory distress.      Breath sounds: Wheezing present. No decreased breath sounds, rhonchi or rales.      Comments: Faint occasional expiratory wheezing appreciated.  Abdominal:      Palpations: Abdomen is soft.      Tenderness: There is no abdominal tenderness. There is no guarding or rebound.     Musculoskeletal:         General: No swelling. Normal range of motion.      Cervical back: Normal range of motion and neck supple.     Skin:     General: Skin is warm and dry.      Capillary Refill: Capillary refill takes less than 2 seconds.      Findings: Rash present.      Comments: Diffuse urticarial rash present.     Neurological:      General: No focal deficit present.      Mental Status: He is alert and oriented to person, place, and time.     Psychiatric:         Mood and Affect: Mood normal.         Behavior: Behavior normal.         Results Reviewed       Procedure Component Value Units Date/Time    HS Troponin I 2hr [[id number]]  (Normal) Collected: 06/08/25 1112    Lab Status: Final result Specimen: Blood from Arm, Left Updated: 06/08/25 1146     hs TnI 2hr 20 ng/L      Delta 2hr hsTnI 10 ng/L     HS Troponin 0hr (reflex protocol) [[id number]]  (Normal) Collected:  06/08/25 0923    Lab Status: Final result Specimen: Blood from Arm, Left Updated: 06/08/25 0957     hs TnI 0hr 10 ng/L     Comprehensive metabolic panel [[id number]]  (Abnormal) Collected: 06/08/25 0923    Lab Status: Final result Specimen: Blood from Arm, Left Updated: 06/08/25 0953     Sodium 136 mmol/L      Potassium 4.3 mmol/L      Chloride 107 mmol/L      CO2 20 mmol/L      ANION GAP 9 mmol/L      BUN 15 mg/dL      Creatinine 0.96 mg/dL      Glucose 115 mg/dL      Calcium 9.2 mg/dL      AST 29 U/L      ALT 35 U/L      Alkaline Phosphatase 100 U/L      Total Protein 6.6 g/dL      Albumin 3.9 g/dL      Total Bilirubin 0.45 mg/dL      eGFR 86 ml/min/1.73sq m     Narrative:      [employer] guidelines for Chronic Kidney Disease (CKD):     Stage 1 with normal or high GFR (GFR > 90 mL/min/1.73 square meters)    Stage 2 Mild CKD (GFR = 60-89 mL/min/1.73 square meters)    Stage 3A Moderate CKD (GFR = 45-59 mL/min/1.73 square meters)    Stage 3B Moderate CKD (GFR = 30-44 mL/min/1.73 square meters)    Stage 4 Severe CKD (GFR = 15-29 mL/min/1.73 square meters)    Stage 5 End Stage CKD (GFR <15 mL/min/1.73 square meters)  Note: GFR calculation is accurate only with a steady state creatinine    CBC and differential [[id number]] Collected: 06/08/25 0923    Lab Status: Final result Specimen: Blood from Arm, Left Updated: 06/08/25 0928     WBC 8.35 Thousand/uL      RBC 4.95 Million/uL      Hemoglobin 15.9 g/dL      Hematocrit 46.9 %      MCV 95 fL      MCH 32.1 pg      MCHC 33.9 g/dL      RDW 12.1 %      MPV 10.7 fL      Platelets 236 Thousands/uL      nRBC 0 /100 WBCs      Segmented % 62 %      Immature Grans % 0 %      Lymphocytes % 30 %      Monocytes % 6 %      Eosinophils Relative 1 %      Basophils Relative 1 %      Absolute Neutrophils 5.20 Thousands/µL      Absolute Immature Grans 0.03 Thousand/uL      Absolute Lymphocytes 2.52 Thousands/µL      Absolute Monocytes 0.47 Thousand/µL      Eosinophils  Absolute 0.09 Thousand/µL      Basophils Absolute 0.04 Thousands/µL             XR chest 1 view portable   ED Interpretation by Marco Antonio Fernandez MD (06/08 1009)   No acute cardiopulmonary disease appreciated.      Final Interpretation by Zenobia Velasco MD (06/08 1122)      No acute cardiopulmonary disease.            Workstation performed: [id number]             Procedures    ED Medication and Procedure Management   Prior to Admission Medications   Prescriptions Last Dose Informant Patient Reported? Taking?   aspirin (ECOTRIN LOW STRENGTH) 81 mg EC tablet   Yes No   Sig: Take 81 mg by mouth daily   atorvastatin (Lipitor) 40 mg tablet   Yes No   Sig: Take by mouth   gabapentin (NEURONTIN) 300 mg capsule   No No   Sig: Take 1 capsule (300 mg total) by mouth 3 (three) times a day   magnesium gluconate (MAGONATE) 500 mg tablet   No No   Sig: Take 1 tablet (500 mg total) by mouth 2 (two) times a day for 7 days   multivitamin (THERAGRAN) TABS   Yes No   Sig: Take 1 tablet by mouth daily      Facility-Administered Medications: None     Patient's Medications   Discharge Prescriptions    ALBUTEROL (PROVENTIL HFA) 90 MCG/ACT INHALER    Inhale 2 puffs every 6 (six) hours as needed (Shortness of breath, cough, or wheeze.)       Start Date: 6/8/2025  End Date: --       Order Dose: 2 puffs       Quantity: 6.7 g    Refills: 0    PREDNISONE 20 MG TABLET    Take 3 tablets (60 mg total) by mouth daily Do not start before June 9, 2025.       Start Date: 6/9/2025  End Date: --       Order Dose: 60 mg       Quantity: 15 tablet    Refills: 0     No discharge procedures on file.  ED SEPSIS DOCUMENTATION   Time reflects when diagnosis was documented in both MDM as applicable and the Disposition within this note       Time User Action Codes Description Comment    6/8/2025 12:14 PM Marco Antonio Fernandez [T78.40XA] Acute allergic reaction, initial encounter     6/8/2025 12:14 PM Marco Antonio Fernandez [L50.9] Hives     6/8/2025 12:14 PM  Marco Antonio Fernandez Add [J98.01] Bronchospasm     6/8/2025 12:14 PM Marco Antonio Fernandez Add [R07.9] Chest pain, unspecified type                      [1]   Past Medical History:  Diagnosis Date    Back pain     High cholesterol     Myocardial infarct (HCC)    [2]   Past Surgical History:  Procedure Laterality Date    APPENDECTOMY      CORONARY ANGIOPLASTY WITH STENT PLACEMENT      IR SPINE AND PAIN PROCEDURE  12/31/2024   [3] No family history on file.  [4]   Social History  Tobacco Use    Smoking status: Every Day     Types: Cigarettes    Smokeless tobacco: Never   Vaping Use    Vaping status: Never Used   Substance Use Topics    Alcohol use: Not Currently    Drug use: Never        Marco Antonio Fernandez MD  06/08/25 1218

## 2025-06-08 NOTE — DISCHARGE INSTRUCTIONS
Take medication as prescribed and discussed.  Also recommend over-the-counter antihistamines.  May take Benadryl at over-the-counter dosing every 6 hours.  For a nonsedating alternative to Benadryl, may take Zyrtec/cetirizine 10 mg twice daily.  May also take Pepcid/famotidine to help with any recurrent rash.  Follow-up with primary care for recheck of symptoms and further management recommendations.  Also follow-up with cardiology.  Return to the ED for any new or worsening symptoms or concerns.

## 2025-06-09 LAB
ATRIAL RATE: 49 BPM
P AXIS: 0 DEGREES
PR INTERVAL: 132 MS
QRS AXIS: 12 DEGREES
QRSD INTERVAL: 82 MS
QT INTERVAL: 478 MS
QTC INTERVAL: 431 MS
T WAVE AXIS: 8 DEGREES
VENTRICULAR RATE: 49 BPM

## 2025-06-11 ENCOUNTER — HOSPITAL ENCOUNTER (EMERGENCY)
Facility: HOSPITAL | Age: 60
Discharge: HOME | End: 2025-06-11
Attending: EMERGENCY MEDICINE
Payer: OTHER GOVERNMENT

## 2025-06-11 ENCOUNTER — APPOINTMENT (EMERGENCY)
Dept: RADIOLOGY | Facility: HOSPITAL | Age: 60
End: 2025-06-11
Payer: OTHER GOVERNMENT

## 2025-06-11 VITALS
HEIGHT: 70 IN | DIASTOLIC BLOOD PRESSURE: 81 MMHG | SYSTOLIC BLOOD PRESSURE: 152 MMHG | OXYGEN SATURATION: 98 % | WEIGHT: 180 LBS | RESPIRATION RATE: 20 BRPM | BODY MASS INDEX: 25.77 KG/M2 | TEMPERATURE: 97.4 F | HEART RATE: 60 BPM

## 2025-06-11 DIAGNOSIS — R07.9 CHEST PAIN, UNSPECIFIED TYPE: Primary | ICD-10-CM

## 2025-06-11 DIAGNOSIS — T78.40XA ALLERGIC REACTION, INITIAL ENCOUNTER: ICD-10-CM

## 2025-06-11 DIAGNOSIS — J98.01 BRONCHOSPASM: ICD-10-CM

## 2025-06-11 LAB
2HR DELTA HS TROPONIN: 6 NG/L
ALBUMIN SERPL BCG-MCNC: 3.8 G/DL (ref 3.5–5)
ALP SERPL-CCNC: 89 U/L (ref 34–104)
ALT SERPL W P-5'-P-CCNC: 41 U/L (ref 7–52)
ANION GAP SERPL CALCULATED.3IONS-SCNC: 9 MMOL/L (ref 4–13)
APTT PPP: 23 SECONDS (ref 23–34)
AST SERPL W P-5'-P-CCNC: 22 U/L (ref 13–39)
ATRIAL RATE: 47 BPM
BASOPHILS # BLD AUTO: 0.01 THOUSANDS/ÂΜL (ref 0–0.1)
BASOPHILS NFR BLD AUTO: 0 % (ref 0–1)
BILIRUB SERPL-MCNC: 0.47 MG/DL (ref 0.2–1)
BNP SERPL-MCNC: 41 PG/ML (ref 0–100)
BUN SERPL-MCNC: 21 MG/DL (ref 5–25)
CALCIUM SERPL-MCNC: 9.4 MG/DL (ref 8.4–10.2)
CARDIAC TROPONIN I PNL SERPL HS: 10 NG/L (ref ?–50)
CARDIAC TROPONIN I PNL SERPL HS: 16 NG/L (ref ?–50)
CHLORIDE SERPL-SCNC: 104 MMOL/L (ref 96–108)
CO2 SERPL-SCNC: 26 MMOL/L (ref 21–32)
CREAT SERPL-MCNC: 1.01 MG/DL (ref 0.6–1.3)
D DIMER PPP FEU-MCNC: <0.27 UG/ML FEU
EOSINOPHIL # BLD AUTO: 0.05 THOUSAND/ÂΜL (ref 0–0.61)
EOSINOPHIL NFR BLD AUTO: 1 % (ref 0–6)
ERYTHROCYTE [DISTWIDTH] IN BLOOD BY AUTOMATED COUNT: 12.4 % (ref 11.6–15.1)
GFR SERPL CREATININE-BSD FRML MDRD: 81 ML/MIN/1.73SQ M
GLUCOSE SERPL-MCNC: 87 MG/DL (ref 65–140)
HCT VFR BLD AUTO: 43.9 % (ref 36.5–49.3)
HGB BLD-MCNC: 15 G/DL (ref 12–17)
IMM GRANULOCYTES # BLD AUTO: 0.05 THOUSAND/UL (ref 0–0.2)
IMM GRANULOCYTES NFR BLD AUTO: 1 % (ref 0–2)
INR PPP: 0.97 (ref 0.85–1.19)
LYMPHOCYTES # BLD AUTO: 4.1 THOUSANDS/ÂΜL (ref 0.6–4.47)
LYMPHOCYTES NFR BLD AUTO: 42 % (ref 14–44)
MCH RBC QN AUTO: 32.3 PG (ref 26.8–34.3)
MCHC RBC AUTO-ENTMCNC: 34.2 G/DL (ref 31.4–37.4)
MCV RBC AUTO: 94 FL (ref 82–98)
MONOCYTES # BLD AUTO: 0.46 THOUSAND/ÂΜL (ref 0.17–1.22)
MONOCYTES NFR BLD AUTO: 5 % (ref 4–12)
NEUTROPHILS # BLD AUTO: 5.02 THOUSANDS/ÂΜL (ref 1.85–7.62)
NEUTS SEG NFR BLD AUTO: 51 % (ref 43–75)
NRBC BLD AUTO-RTO: 0 /100 WBCS
P AXIS: 46 DEGREES
PLATELET # BLD AUTO: 226 THOUSANDS/UL (ref 149–390)
PMV BLD AUTO: 11 FL (ref 8.9–12.7)
POTASSIUM SERPL-SCNC: 3.2 MMOL/L (ref 3.5–5.3)
PR INTERVAL: 134 MS
PROT SERPL-MCNC: 6.3 G/DL (ref 6.4–8.4)
PROTHROMBIN TIME: 13.3 SECONDS (ref 12.3–15)
QRS AXIS: 47 DEGREES
QRSD INTERVAL: 100 MS
QT INTERVAL: 464 MS
QTC INTERVAL: 410 MS
RBC # BLD AUTO: 4.65 MILLION/UL (ref 3.88–5.62)
SODIUM SERPL-SCNC: 139 MMOL/L (ref 135–147)
T WAVE AXIS: 60 DEGREES
VENTRICULAR RATE: 47 BPM
WBC # BLD AUTO: 9.69 THOUSAND/UL (ref 4.31–10.16)

## 2025-06-11 PROCEDURE — 84484 ASSAY OF TROPONIN QUANT: CPT | Performed by: EMERGENCY MEDICINE

## 2025-06-11 PROCEDURE — 85379 FIBRIN DEGRADATION QUANT: CPT | Performed by: EMERGENCY MEDICINE

## 2025-06-11 PROCEDURE — 71045 X-RAY EXAM CHEST 1 VIEW: CPT

## 2025-06-11 PROCEDURE — 96375 TX/PRO/DX INJ NEW DRUG ADDON: CPT

## 2025-06-11 PROCEDURE — 85730 THROMBOPLASTIN TIME PARTIAL: CPT | Performed by: EMERGENCY MEDICINE

## 2025-06-11 PROCEDURE — 36415 COLL VENOUS BLD VENIPUNCTURE: CPT | Performed by: EMERGENCY MEDICINE

## 2025-06-11 PROCEDURE — 96361 HYDRATE IV INFUSION ADD-ON: CPT

## 2025-06-11 PROCEDURE — 93005 ELECTROCARDIOGRAM TRACING: CPT

## 2025-06-11 PROCEDURE — 94640 AIRWAY INHALATION TREATMENT: CPT

## 2025-06-11 PROCEDURE — 85610 PROTHROMBIN TIME: CPT | Performed by: EMERGENCY MEDICINE

## 2025-06-11 PROCEDURE — 80053 COMPREHEN METABOLIC PANEL: CPT | Performed by: EMERGENCY MEDICINE

## 2025-06-11 PROCEDURE — 85025 COMPLETE CBC W/AUTO DIFF WBC: CPT | Performed by: EMERGENCY MEDICINE

## 2025-06-11 PROCEDURE — 99285 EMERGENCY DEPT VISIT HI MDM: CPT

## 2025-06-11 PROCEDURE — 96374 THER/PROPH/DIAG INJ IV PUSH: CPT

## 2025-06-11 PROCEDURE — 83880 ASSAY OF NATRIURETIC PEPTIDE: CPT | Performed by: EMERGENCY MEDICINE

## 2025-06-11 PROCEDURE — 99285 EMERGENCY DEPT VISIT HI MDM: CPT | Performed by: EMERGENCY MEDICINE

## 2025-06-11 RX ORDER — KETOROLAC TROMETHAMINE 30 MG/ML
15 INJECTION, SOLUTION INTRAMUSCULAR; INTRAVENOUS ONCE
Status: COMPLETED | OUTPATIENT
Start: 2025-06-11 | End: 2025-06-11

## 2025-06-11 RX ORDER — ASPIRIN 81 MG/1
81 TABLET, CHEWABLE ORAL ONCE
Status: DISCONTINUED | OUTPATIENT
Start: 2025-06-11 | End: 2025-06-11

## 2025-06-11 RX ORDER — METHYLPREDNISOLONE SODIUM SUCCINATE 125 MG/2ML
125 INJECTION, POWDER, LYOPHILIZED, FOR SOLUTION INTRAMUSCULAR; INTRAVENOUS ONCE
Status: COMPLETED | OUTPATIENT
Start: 2025-06-11 | End: 2025-06-11

## 2025-06-11 RX ORDER — DIPHENHYDRAMINE HYDROCHLORIDE 50 MG/ML
25 INJECTION, SOLUTION INTRAMUSCULAR; INTRAVENOUS ONCE
Status: COMPLETED | OUTPATIENT
Start: 2025-06-11 | End: 2025-06-11

## 2025-06-11 RX ORDER — FAMOTIDINE 10 MG/ML
20 INJECTION, SOLUTION INTRAVENOUS ONCE
Status: COMPLETED | OUTPATIENT
Start: 2025-06-11 | End: 2025-06-11

## 2025-06-11 RX ORDER — IPRATROPIUM BROMIDE AND ALBUTEROL SULFATE 2.5; .5 MG/3ML; MG/3ML
3 SOLUTION RESPIRATORY (INHALATION) ONCE
Status: COMPLETED | OUTPATIENT
Start: 2025-06-11 | End: 2025-06-11

## 2025-06-11 RX ADMIN — METHYLPREDNISOLONE SODIUM SUCCINATE 125 MG: 125 INJECTION, POWDER, FOR SOLUTION INTRAMUSCULAR; INTRAVENOUS at 10:09

## 2025-06-11 RX ADMIN — FAMOTIDINE 20 MG: 10 INJECTION, SOLUTION INTRAVENOUS at 10:15

## 2025-06-11 RX ADMIN — DIPHENHYDRAMINE HYDROCHLORIDE 25 MG: 50 INJECTION, SOLUTION INTRAMUSCULAR; INTRAVENOUS at 10:14

## 2025-06-11 RX ADMIN — KETOROLAC TROMETHAMINE 15 MG: 30 INJECTION, SOLUTION INTRAMUSCULAR; INTRAVENOUS at 10:12

## 2025-06-11 RX ADMIN — IPRATROPIUM BROMIDE AND ALBUTEROL SULFATE 3 ML: .5; 3 SOLUTION RESPIRATORY (INHALATION) at 10:07

## 2025-06-11 RX ADMIN — SODIUM CHLORIDE 1000 ML: 0.9 INJECTION, SOLUTION INTRAVENOUS at 10:17

## 2025-06-11 NOTE — ED PROVIDER NOTES
Time reflects when diagnosis was documented in both MDM as applicable and the Disposition within this note       Time User Action Codes Description Comment    6/11/2025 12:58 PM Marco Antonio Fernandez [R07.9] Chest pain, unspecified type     6/11/2025 12:58 PM Marco Antonio Fernandez [J98.01] Bronchospasm     6/11/2025 12:58 PM Marco Antonio Fernandez [T78.40XA] Allergic reaction, initial encounter           ED Disposition       ED Disposition   Discharge    Condition   Stable    Date/Time   Wed Jun 11, 2025 12:58 PM    Comment   Zac Cha discharge to home/self care.                   Assessment & Plan       Medical Decision Making  Patient was seen and evaluated for his presentation as outlined.  At risk for acute coronary syndrome, bronchospasm, allergic reaction, anaphylaxis, electrolyte dysfunction, organ dysfunction, other.  Workup as shown.  No concerning abnormalities noted on initial lab work.  Initial troponin 10, repeat 16, doubt acute cardiac injury or ischemia.  Feels better with steroids, anti-inflammatories, DuoNeb given in the ED.  Also given antihistamines.  Presentation most consistent with recurrent allergic reaction.  Unclear etiology.  Reviewed potential allergens and irritants, suggested washing bed sheets, closing house and using air conditioning, use of air purifier.  Considered admission for cardiac observation, but heart score is 4, no significant troponin change, no signs of cardiac ischemia, estimated to be low risk, no clear benefit to admission for cardiac workup.  Did advise follow-up with cardiology as outpatient.  Also recommended follow-up with primary care.  Suggested consideration of allergist referral.  Strict return precautions reviewed.  All questions answered.  Stable for discharge from the emergency department.    Amount and/or Complexity of Data Reviewed  Labs: ordered.  Radiology: ordered and independent interpretation performed.  ECG/medicine tests: ordered and independent  interpretation performed.     Details: EKG per my interpretation demonstrates sinus bradycardia at a ventricular rate of 47 bpm.  Normal axis, normal intervals.  No acute ST elevations or T wave inversions.  Compared with prior EKG from June 8, 2025, nonspecific T wave abnormality no longer present in inferior leads.    Risk  OTC drugs.  Prescription drug management.             Medications   sodium chloride 0.9 % bolus 1,000 mL (0 mL Intravenous Stopped 6/11/25 1057)   ketorolac (TORADOL) injection 15 mg (15 mg Intravenous Given 6/11/25 1012)   ipratropium-albuterol (DUO-NEB) 0.5-2.5 mg/3 mL inhalation solution 3 mL (3 mL Nebulization Given 6/11/25 1007)   diphenhydrAMINE (BENADRYL) injection 25 mg (25 mg Intravenous Given 6/11/25 1014)   Famotidine (PF) (PEPCID) injection 20 mg (20 mg Intravenous Given 6/11/25 1015)   methylPREDNISolone sodium succinate (Solu-MEDROL) injection 125 mg (125 mg Intravenous Given 6/11/25 1009)       ED Risk Strat Scores   HEART Risk Score      Flowsheet Row Most Recent Value   Heart Score Risk Calculator    History 1 Filed at: 06/11/2025 1103   ECG 0 Filed at: 06/11/2025 1103   Age 1 Filed at: 06/11/2025 1103   Risk Factors 2 Filed at: 06/11/2025 1103   Troponin 0 Filed at: 06/11/2025 1103   HEART Score 4 Filed at: 06/11/2025 1103          HEART Risk Score      Flowsheet Row Most Recent Value   Heart Score Risk Calculator    History 1 Filed at: 06/11/2025 1103   ECG 0 Filed at: 06/11/2025 1103   Age 1 Filed at: 06/11/2025 1103   Risk Factors 2 Filed at: 06/11/2025 1103   Troponin 0 Filed at: 06/11/2025 1103   HEART Score 4 Filed at: 06/11/2025 1103                      No data recorded        SBIRT 22yo+      Flowsheet Row Most Recent Value   Initial Alcohol Screen: US AUDIT-C     1. How often do you have a drink containing alcohol? 0 Filed at: 06/11/2025 0943   2. How many drinks containing alcohol do you have on a typical day you are drinking?  0 Filed at: 06/11/2025 0943   3a. Male  UNDER 65: How often do you have five or more drinks on one occasion? 0 Filed at: 06/11/2025 0943   Audit-C Score 0 Filed at: 06/11/2025 0943   MARYURI: How many times in the past year have you...    Used an illegal drug or used a prescription medication for non-medical reasons? Never Filed at: 06/11/2025 0943                            History of Present Illness       Chief Complaint   Patient presents with    Chest Pain     Pt arrives via EMS due to C that radiated into right arm, seen on Sunday for allergic reaction - also reports feeling itchy on head and ears        Past Medical History[1]   Past Surgical History[2]   Family History[3]   Social History[4]   E-Cigarette/Vaping    E-Cigarette Use Never User       E-Cigarette/Vaping Substances      I have reviewed and agree with the history as documented.     Patient presents emergency department via EMS from home for evaluation of chest pain that started this morning upon waking from sleep.  States it feels like a tightness, goes across his chest and down his right arm.  Improving since arrival.  Aspirin given by EMS.  Currently rates pain at 2 out of 10.  Also complains of itching in his ears and scalp.  Seen by me on Sunday, 3 days ago, for symptoms of chest pain and chest tightness, hives, amount of chest allergic reaction.  Of note, initial troponin was 10, repeat was 20.  Discussed 4-hour troponin, but patient elected to go home.  Does have prior cardiac history.  Was discharged on prescription for steroids, albuterol.  States he has been taking the steroids, but did not yet take them today, as he had just gotten up.  Has not noticed any additional eyes.  Denies any nausea, vomiting, or abdominal pain.  No throat pain or difficulty swallowing.  Also reports episode of feeling very lightheaded, lowered himself to the ground, as he thought he was going to pass out.  He did not lose consciousness.  No injury.  No diaphoresis.  No other complaints, modifying  factors, or associated symptoms.        Review of Systems   All other systems reviewed and are negative.          Objective       ED Triage Vitals   Temperature Pulse Blood Pressure Respirations SpO2 Patient Position - Orthostatic VS   06/11/25 0943 06/11/25 0943 06/11/25 0943 06/11/25 0943 06/11/25 0943 06/11/25 0943   (!) 97.4 °F (36.3 °C) (!) 48 121/61 18 95 % Sitting      Temp Source Heart Rate Source BP Location FiO2 (%) Pain Score    06/11/25 0943 06/11/25 0943 06/11/25 0943 -- 06/11/25 1012    Temporal Monitor Right arm  2      Vitals      Date and Time Temp Pulse SpO2 Resp BP Pain Score FACES Pain Rating User   06/11/25 1200 -- 56 97 % 19 125/69 -- -- MB   06/11/25 1100 -- 57 99 % 26 125/87 -- -- MB   06/11/25 1030 -- 45 100 % 19 138/69 -- -- MB   06/11/25 1012 -- -- -- -- -- 2 -- MB   06/11/25 0945 -- 48 96 % 20 121/61 -- -- MB   06/11/25 0943 97.4 °F (36.3 °C) 48 95 % 18 121/61 -- -- SA            Physical Exam  Vitals and nursing note reviewed.   Constitutional:       General: He is not in acute distress.     Appearance: He is well-developed. He is not ill-appearing.   HENT:      Head: Normocephalic and atraumatic.     Eyes:      Conjunctiva/sclera: Conjunctivae normal.       Cardiovascular:      Rate and Rhythm: Normal rate and regular rhythm.      Heart sounds: Normal heart sounds. No murmur heard.     No friction rub. No gallop.   Pulmonary:      Effort: Pulmonary effort is normal. No respiratory distress.      Breath sounds: Decreased breath sounds present. No wheezing, rhonchi or rales.   Abdominal:      Palpations: Abdomen is soft.      Tenderness: There is no abdominal tenderness.     Musculoskeletal:         General: No swelling. Normal range of motion.      Cervical back: Neck supple.     Skin:     General: Skin is warm and dry.      Capillary Refill: Capillary refill takes less than 2 seconds.      Comments: Erythema noted to the head, face, ears.  No raised rash, no desquamation, no vesicles or  other lesions.     Neurological:      General: No focal deficit present.      Mental Status: He is alert and oriented to person, place, and time.     Psychiatric:         Mood and Affect: Mood normal.         Behavior: Behavior normal.         Results Reviewed       Procedure Component Value Units Date/Time    HS Troponin I 2hr [[id number]]  (Normal) Collected: 06/11/25 1210    Lab Status: Final result Specimen: Blood from Arm, Right Updated: 06/11/25 1236     hs TnI 2hr 16 ng/L      Delta 2hr hsTnI 6 ng/L     HS Troponin I 4hr [[id number]]     Lab Status: No result Specimen: Blood     B-Type Natriuretic Peptide(BNP) [[id number]]  (Normal) Collected: 06/11/25 1006    Lab Status: Final result Specimen: Blood from Arm, Right Updated: 06/11/25 1056     BNP 41 pg/mL     HS Troponin 0hr (reflex protocol) [[id number]]  (Normal) Collected: 06/11/25 1006    Lab Status: Final result Specimen: Blood from Arm, Right Updated: 06/11/25 1048     hs TnI 0hr 10 ng/L     Comprehensive metabolic panel [[id number]]  (Abnormal) Collected: 06/11/25 1006    Lab Status: Final result Specimen: Blood from Arm, Right Updated: 06/11/25 1044     Sodium 139 mmol/L      Potassium 3.2 mmol/L      Chloride 104 mmol/L      CO2 26 mmol/L      ANION GAP 9 mmol/L      BUN 21 mg/dL      Creatinine 1.01 mg/dL      Glucose 87 mg/dL      Calcium 9.4 mg/dL      AST 22 U/L      ALT 41 U/L      Alkaline Phosphatase 89 U/L      Total Protein 6.3 g/dL      Albumin 3.8 g/dL      Total Bilirubin 0.47 mg/dL      eGFR 81 ml/min/1.73sq m     Narrative:      [employer] guidelines for Chronic Kidney Disease (CKD):     Stage 1 with normal or high GFR (GFR > 90 mL/min/1.73 square meters)    Stage 2 Mild CKD (GFR = 60-89 mL/min/1.73 square meters)    Stage 3A Moderate CKD (GFR = 45-59 mL/min/1.73 square meters)    Stage 3B Moderate CKD (GFR = 30-44 mL/min/1.73 square meters)    Stage 4 Severe CKD (GFR = 15-29 mL/min/1.73 square meters)    Stage 5 End  Stage CKD (GFR <15 mL/min/1.73 square meters)  Note: GFR calculation is accurate only with a steady state creatinine    D-Dimer [[id number]]  (Normal) Collected: 06/11/25 1006    Lab Status: Final result Specimen: Blood from Arm, Right Updated: 06/11/25 1042     D-Dimer, Quant <0.27 ug/ml FEU     Narrative:      In the evaluation for possible pulmonary embolism, in the appropriate (Well's Score of 4 or less) patient, the age adjusted d-dimer cutoff for this patient can be calculated as:    Age x 0.01 (in ug/mL) for Age-adjusted D-dimer exclusion threshold for a patient over 50 years.    Protime-INR [[id number]]  (Normal) Collected: 06/11/25 1006    Lab Status: Final result Specimen: Blood from Arm, Right Updated: 06/11/25 1037     Protime 13.3 seconds      INR 0.97    Narrative:      INR Therapeutic Range    Indication                                             INR Range      Atrial Fibrillation                                               2.0-3.0  Hypercoagulable State                                    2.0.2.3  Left Ventricular Asist Device                            2.0-3.0  Mechanical Heart Valve                                  -    Aortic(with afib, MI, embolism, HF, LA enlargement,    and/or coagulopathy)                                     2.0-3.0 (2.5-3.5)     Mitral                                                             2.5-3.5  Prosthetic/Bioprosthetic Heart Valve               2.0-3.0  Venous thromboembolism (VTE: VT, PE        2.0-3.0    APTT [[id number]]  (Normal) Collected: 06/11/25 1006    Lab Status: Final result Specimen: Blood from Arm, Right Updated: 06/11/25 1037     PTT 23 seconds     CBC and differential [[id number]] Collected: 06/11/25 1006    Lab Status: Final result Specimen: Blood from Arm, Right Updated: 06/11/25 1024     WBC 9.69 Thousand/uL      RBC 4.65 Million/uL      Hemoglobin 15.0 g/dL      Hematocrit 43.9 %      MCV 94 fL      MCH 32.3 pg      MCHC 34.2 g/dL      RDW 12.4 %       MPV 11.0 fL      Platelets 226 Thousands/uL      nRBC 0 /100 WBCs      Segmented % 51 %      Immature Grans % 1 %      Lymphocytes % 42 %      Monocytes % 5 %      Eosinophils Relative 1 %      Basophils Relative 0 %      Absolute Neutrophils 5.02 Thousands/µL      Absolute Immature Grans 0.05 Thousand/uL      Absolute Lymphocytes 4.10 Thousands/µL      Absolute Monocytes 0.46 Thousand/µL      Eosinophils Absolute 0.05 Thousand/µL      Basophils Absolute 0.01 Thousands/µL             XR chest 1 view portable   ED Interpretation by Marco Antonio Fernandez MD (06/11 1114)   No acute cardiopulmonary disease appreciated.      Final Interpretation by Zenobia Velasco MD (06/11 1152)      No acute cardiopulmonary disease.            Workstation performed: [id number]             Procedures    ED Medication and Procedure Management   Prior to Admission Medications   Prescriptions Last Dose Informant Patient Reported? Taking?   albuterol (Proventil HFA) 90 mcg/act inhaler   No No   Sig: Inhale 2 puffs every 6 (six) hours as needed (Shortness of breath, cough, or wheeze.)   aspirin (ECOTRIN LOW STRENGTH) 81 mg EC tablet   Yes No   Sig: Take 81 mg by mouth daily   atorvastatin (Lipitor) 40 mg tablet   Yes No   Sig: Take by mouth   gabapentin (NEURONTIN) 300 mg capsule   No No   Sig: Take 1 capsule (300 mg total) by mouth 3 (three) times a day   magnesium gluconate (MAGONATE) 500 mg tablet   No No   Sig: Take 1 tablet (500 mg total) by mouth 2 (two) times a day for 7 days   multivitamin (THERAGRAN) TABS   Yes No   Sig: Take 1 tablet by mouth daily   predniSONE 20 mg tablet   No No   Sig: Take 3 tablets (60 mg total) by mouth daily Do not start before June 9, 2025.      Facility-Administered Medications: None     Patient's Medications   Discharge Prescriptions    No medications on file     No discharge procedures on file.  ED SEPSIS DOCUMENTATION   Time reflects when diagnosis was documented in both MDM as applicable and the  Disposition within this note       Time User Action Codes Description Comment    6/11/2025 12:58 PM Marco Antonio Fernandez [R07.9] Chest pain, unspecified type     6/11/2025 12:58 PM Marco Antonio Fernandez [J98.01] Bronchospasm     6/11/2025 12:58 PM Marco Antonio Fernandez [T78.40XA] Allergic reaction, initial encounter                      [1]   Past Medical History:  Diagnosis Date    Back pain     High cholesterol     Myocardial infarct (HCC)    [2]   Past Surgical History:  Procedure Laterality Date    APPENDECTOMY      CORONARY ANGIOPLASTY WITH STENT PLACEMENT      IR SPINE AND PAIN PROCEDURE  12/31/2024   [3] No family history on file.  [4]   Social History  Tobacco Use    Smoking status: Every Day     Types: Cigarettes    Smokeless tobacco: Never   Vaping Use    Vaping status: Never Used   Substance Use Topics    Alcohol use: Not Currently    Drug use: Never        Marco Antonio Fernandez MD  06/11/25 [id number]

## 2025-06-11 NOTE — DISCHARGE INSTRUCTIONS
Continue prednisone as prescribed, next dose tomorrow.  Continue antihistamines.  Continue albuterol as needed.  Take steps to avoid environmental allergens and irritants as discussed.  Follow-up closely with primary care and cardiology.  Return to the ED for any new or worsening symptoms or concerns.